# Patient Record
Sex: MALE | Race: WHITE | NOT HISPANIC OR LATINO | Employment: OTHER | URBAN - METROPOLITAN AREA
[De-identification: names, ages, dates, MRNs, and addresses within clinical notes are randomized per-mention and may not be internally consistent; named-entity substitution may affect disease eponyms.]

---

## 2020-06-01 ENCOUNTER — TRANSCRIBE ORDERS (OUTPATIENT)
Dept: CARDIOLOGY | Facility: CLINIC | Age: 50
End: 2020-06-01

## 2020-06-01 DIAGNOSIS — R94.39 ABNORMAL STRESS TEST: Primary | ICD-10-CM

## 2020-06-07 ENCOUNTER — APPOINTMENT (OUTPATIENT)
Dept: PREADMISSION TESTING | Facility: HOSPITAL | Age: 50
End: 2020-06-07

## 2020-06-07 PROCEDURE — C9803 HOPD COVID-19 SPEC COLLECT: HCPCS

## 2020-06-07 PROCEDURE — U0004 COV-19 TEST NON-CDC HGH THRU: HCPCS

## 2020-06-07 PROCEDURE — U0002 COVID-19 LAB TEST NON-CDC: HCPCS

## 2020-06-08 LAB
REF LAB TEST METHOD: NORMAL
SARS-COV-2 RNA RESP QL NAA+PROBE: NOT DETECTED

## 2020-06-09 ENCOUNTER — HOSPITAL ENCOUNTER (OUTPATIENT)
Facility: HOSPITAL | Age: 50
Discharge: HOME | End: 2020-06-09
Attending: INTERNAL MEDICINE | Admitting: INTERNAL MEDICINE

## 2020-06-09 VITALS
SYSTOLIC BLOOD PRESSURE: 134 MMHG | RESPIRATION RATE: 18 BRPM | BODY MASS INDEX: 30.65 KG/M2 | TEMPERATURE: 98.8 F | HEART RATE: 71 BPM | WEIGHT: 195.3 LBS | OXYGEN SATURATION: 95 % | DIASTOLIC BLOOD PRESSURE: 86 MMHG | HEIGHT: 67 IN

## 2020-06-09 DIAGNOSIS — R94.39 ABNORMAL STRESS TEST: ICD-10-CM

## 2020-06-09 LAB
ACT BLD: 367 SECONDS (ref 82–152)
ALBUMIN SERPL-MCNC: 4.3 G/DL (ref 3.5–5.2)
ALBUMIN/GLOB SERPL: 1.7 G/DL
ALP SERPL-CCNC: 86 U/L (ref 39–117)
ALT SERPL W P-5'-P-CCNC: 22 U/L (ref 1–41)
ANION GAP SERPL CALCULATED.3IONS-SCNC: 10 MMOL/L (ref 5–15)
AST SERPL-CCNC: 19 U/L (ref 1–40)
BILIRUB SERPL-MCNC: 0.4 MG/DL (ref 0.2–1.2)
BUN BLD-MCNC: 27 MG/DL (ref 6–20)
BUN/CREAT SERPL: 24.8 (ref 7–25)
CALCIUM SPEC-SCNC: 8.9 MG/DL (ref 8.6–10.5)
CHLORIDE SERPL-SCNC: 105 MMOL/L (ref 98–107)
CHOLEST SERPL-MCNC: 128 MG/DL (ref 0–200)
CO2 SERPL-SCNC: 24 MMOL/L (ref 22–29)
CREAT BLD-MCNC: 1.09 MG/DL (ref 0.76–1.27)
CREAT BLDA-MCNC: 0.9 MG/DL (ref 0.6–1.3)
DEPRECATED RDW RBC AUTO: 38.2 FL (ref 37–54)
ERYTHROCYTE [DISTWIDTH] IN BLOOD BY AUTOMATED COUNT: 12.1 % (ref 12.3–15.4)
GFR SERPL CREATININE-BSD FRML MDRD: 72 ML/MIN/1.73
GLOBULIN UR ELPH-MCNC: 2.6 GM/DL
GLUCOSE BLD-MCNC: 208 MG/DL (ref 65–99)
GLUCOSE BLDC GLUCOMTR-MCNC: 203 MG/DL (ref 70–130)
HBA1C MFR BLD: 8.2 % (ref 4.8–5.6)
HCT VFR BLD AUTO: 40.2 % (ref 37.5–51)
HDLC SERPL-MCNC: 29 MG/DL (ref 40–60)
HGB BLD-MCNC: 14 G/DL (ref 13–17.7)
LDLC SERPL CALC-MCNC: 70 MG/DL (ref 0–100)
LDLC/HDLC SERPL: 2.43 {RATIO}
MCH RBC QN AUTO: 30.3 PG (ref 26.6–33)
MCHC RBC AUTO-ENTMCNC: 34.8 G/DL (ref 31.5–35.7)
MCV RBC AUTO: 87 FL (ref 79–97)
PLATELET # BLD AUTO: 191 10*3/MM3 (ref 140–450)
PMV BLD AUTO: 10.2 FL (ref 6–12)
POTASSIUM BLD-SCNC: 4.6 MMOL/L (ref 3.5–5.2)
PROT SERPL-MCNC: 6.9 G/DL (ref 6–8.5)
RBC # BLD AUTO: 4.62 10*6/MM3 (ref 4.14–5.8)
SODIUM BLD-SCNC: 139 MMOL/L (ref 136–145)
TRIGL SERPL-MCNC: 143 MG/DL (ref 0–150)
VLDLC SERPL-MCNC: 28.6 MG/DL
WBC NRBC COR # BLD: 5.08 10*3/MM3 (ref 3.4–10.8)

## 2020-06-09 PROCEDURE — 80053 COMPREHEN METABOLIC PANEL: CPT | Performed by: NURSE PRACTITIONER

## 2020-06-09 PROCEDURE — C1769 GUIDE WIRE: HCPCS | Performed by: INTERNAL MEDICINE

## 2020-06-09 PROCEDURE — 93458 L HRT ARTERY/VENTRICLE ANGIO: CPT | Performed by: INTERNAL MEDICINE

## 2020-06-09 PROCEDURE — 85027 COMPLETE CBC AUTOMATED: CPT | Performed by: NURSE PRACTITIONER

## 2020-06-09 PROCEDURE — 93571 IV DOP VEL&/PRESS C FLO 1ST: CPT | Performed by: INTERNAL MEDICINE

## 2020-06-09 PROCEDURE — C9600 PERC DRUG-EL COR STENT SING: HCPCS | Performed by: INTERNAL MEDICINE

## 2020-06-09 PROCEDURE — 80061 LIPID PANEL: CPT | Performed by: INTERNAL MEDICINE

## 2020-06-09 PROCEDURE — 25010000002 HEPARIN (PORCINE) PER 1000 UNITS: Performed by: INTERNAL MEDICINE

## 2020-06-09 PROCEDURE — C1874 STENT, COATED/COV W/DEL SYS: HCPCS | Performed by: INTERNAL MEDICINE

## 2020-06-09 PROCEDURE — 25010000002 FENTANYL CITRATE (PF) 100 MCG/2ML SOLUTION: Performed by: INTERNAL MEDICINE

## 2020-06-09 PROCEDURE — 92928 PRQ TCAT PLMT NTRAC ST 1 LES: CPT | Performed by: INTERNAL MEDICINE

## 2020-06-09 PROCEDURE — C1725 CATH, TRANSLUMIN NON-LASER: HCPCS | Performed by: INTERNAL MEDICINE

## 2020-06-09 PROCEDURE — 36415 COLL VENOUS BLD VENIPUNCTURE: CPT

## 2020-06-09 PROCEDURE — 85347 COAGULATION TIME ACTIVATED: CPT

## 2020-06-09 PROCEDURE — C1887 CATHETER, GUIDING: HCPCS | Performed by: INTERNAL MEDICINE

## 2020-06-09 PROCEDURE — 0 IOPAMIDOL PER 1 ML: Performed by: INTERNAL MEDICINE

## 2020-06-09 PROCEDURE — 82962 GLUCOSE BLOOD TEST: CPT

## 2020-06-09 PROCEDURE — 25010000002 MIDAZOLAM PER 1 MG: Performed by: INTERNAL MEDICINE

## 2020-06-09 PROCEDURE — C1894 INTRO/SHEATH, NON-LASER: HCPCS | Performed by: INTERNAL MEDICINE

## 2020-06-09 PROCEDURE — 82565 ASSAY OF CREATININE: CPT

## 2020-06-09 PROCEDURE — S0260 H&P FOR SURGERY: HCPCS | Performed by: INTERNAL MEDICINE

## 2020-06-09 PROCEDURE — 83036 HEMOGLOBIN GLYCOSYLATED A1C: CPT | Performed by: INTERNAL MEDICINE

## 2020-06-09 DEVICE — XIENCE SIERRA™ EVEROLIMUS ELUTING CORONARY STENT SYSTEM 2.25 MM X 12 MM / RAPID-EXCHANGE
Type: IMPLANTABLE DEVICE | Status: FUNCTIONAL
Brand: XIENCE SIERRA™

## 2020-06-09 RX ORDER — ASPIRIN 325 MG
325 TABLET ORAL ONCE
Status: COMPLETED | OUTPATIENT
Start: 2020-06-09 | End: 2020-06-09

## 2020-06-09 RX ORDER — CARVEDILOL 25 MG/1
25 TABLET ORAL 2 TIMES DAILY WITH MEALS
COMMUNITY

## 2020-06-09 RX ORDER — ATORVASTATIN CALCIUM 10 MG/1
10 TABLET, FILM COATED ORAL DAILY
Status: ON HOLD | COMMUNITY
End: 2020-06-09 | Stop reason: SDUPTHER

## 2020-06-09 RX ORDER — NITROGLYCERIN 0.4 MG/1
0.4 TABLET SUBLINGUAL
COMMUNITY

## 2020-06-09 RX ORDER — ONDANSETRON 2 MG/ML
4 INJECTION INTRAMUSCULAR; INTRAVENOUS EVERY 6 HOURS PRN
Status: DISCONTINUED | OUTPATIENT
Start: 2020-06-09 | End: 2020-06-09 | Stop reason: HOSPADM

## 2020-06-09 RX ORDER — FENTANYL CITRATE 50 UG/ML
INJECTION, SOLUTION INTRAMUSCULAR; INTRAVENOUS AS NEEDED
Status: DISCONTINUED | OUTPATIENT
Start: 2020-06-09 | End: 2020-06-09 | Stop reason: HOSPADM

## 2020-06-09 RX ORDER — LISINOPRIL 10 MG/1
10 TABLET ORAL DAILY
COMMUNITY
End: 2022-08-25

## 2020-06-09 RX ORDER — HEPARIN SODIUM 1000 [USP'U]/ML
INJECTION, SOLUTION INTRAVENOUS; SUBCUTANEOUS AS NEEDED
Status: DISCONTINUED | OUTPATIENT
Start: 2020-06-09 | End: 2020-06-09 | Stop reason: HOSPADM

## 2020-06-09 RX ORDER — MIDAZOLAM HYDROCHLORIDE 1 MG/ML
INJECTION INTRAMUSCULAR; INTRAVENOUS AS NEEDED
Status: DISCONTINUED | OUTPATIENT
Start: 2020-06-09 | End: 2020-06-09 | Stop reason: HOSPADM

## 2020-06-09 RX ORDER — SODIUM CHLORIDE 0.9 % (FLUSH) 0.9 %
3 SYRINGE (ML) INJECTION EVERY 12 HOURS SCHEDULED
Status: DISCONTINUED | OUTPATIENT
Start: 2020-06-09 | End: 2020-06-09 | Stop reason: HOSPADM

## 2020-06-09 RX ORDER — ASPIRIN 81 MG/1
81 TABLET ORAL DAILY
COMMUNITY

## 2020-06-09 RX ORDER — NITROGLYCERIN 0.4 MG/1
0.4 TABLET SUBLINGUAL
Status: DISCONTINUED | OUTPATIENT
Start: 2020-06-09 | End: 2020-06-09 | Stop reason: HOSPADM

## 2020-06-09 RX ORDER — OMEPRAZOLE 20 MG/1
20 CAPSULE, DELAYED RELEASE ORAL DAILY
COMMUNITY
End: 2022-09-12 | Stop reason: SDUPTHER

## 2020-06-09 RX ORDER — CLOPIDOGREL BISULFATE 75 MG/1
TABLET ORAL AS NEEDED
Status: DISCONTINUED | OUTPATIENT
Start: 2020-06-09 | End: 2020-06-09 | Stop reason: HOSPADM

## 2020-06-09 RX ORDER — INSULIN GLARGINE 100 [IU]/ML
35 INJECTION, SOLUTION SUBCUTANEOUS NIGHTLY
COMMUNITY
End: 2022-08-25

## 2020-06-09 RX ORDER — CLOPIDOGREL BISULFATE 75 MG/1
75 TABLET ORAL DAILY
COMMUNITY

## 2020-06-09 RX ORDER — ATORVASTATIN CALCIUM 40 MG/1
40 TABLET, FILM COATED ORAL DAILY
Qty: 30 TABLET | Refills: 6 | Status: SHIPPED | OUTPATIENT
Start: 2020-06-09 | End: 2022-08-25

## 2020-06-09 RX ORDER — ASPIRIN 325 MG
325 TABLET, DELAYED RELEASE (ENTERIC COATED) ORAL DAILY
Status: DISCONTINUED | OUTPATIENT
Start: 2020-06-10 | End: 2020-06-09 | Stop reason: HOSPADM

## 2020-06-09 RX ORDER — LEVOTHYROXINE SODIUM 0.05 MG/1
50 TABLET ORAL DAILY
COMMUNITY
End: 2022-09-12 | Stop reason: SDUPTHER

## 2020-06-09 RX ORDER — ACETAMINOPHEN 325 MG/1
650 TABLET ORAL EVERY 4 HOURS PRN
Status: DISCONTINUED | OUTPATIENT
Start: 2020-06-09 | End: 2020-06-09 | Stop reason: HOSPADM

## 2020-06-09 RX ORDER — SODIUM CHLORIDE 0.9 % (FLUSH) 0.9 %
10 SYRINGE (ML) INJECTION AS NEEDED
Status: DISCONTINUED | OUTPATIENT
Start: 2020-06-09 | End: 2020-06-09 | Stop reason: HOSPADM

## 2020-06-09 RX ORDER — LIDOCAINE HYDROCHLORIDE 10 MG/ML
INJECTION, SOLUTION EPIDURAL; INFILTRATION; INTRACAUDAL; PERINEURAL AS NEEDED
Status: DISCONTINUED | OUTPATIENT
Start: 2020-06-09 | End: 2020-06-09 | Stop reason: HOSPADM

## 2020-06-09 RX ADMIN — ASPIRIN 325 MG ORAL TABLET 325 MG: 325 PILL ORAL at 10:09

## 2020-06-09 NOTE — H&P
[facility] at [facility]   History and physical    Referring Provider: Dr. Judi Dyer    Patient Care Team:  Nicho Dinh MD as PCP - General (Internal Medicine)     Problem list:  1. Coronary artery disease  1. History of MI and RCA stent ([facility]) [facility] 2016  2. 2/26/2020 myocardial perfusion study read by Dr. Mayfield with fixed inferior defect.  Anterolateral fully reversible defect.  Normal nuclear EF  2. Hypertension  3. Dyslipidemia  4. Diabetes  5. Hypothyroidism  6. GERD  7. Surgeries:  1. Appendectomy       Allergies   Allergen Reactions   • Penicillins Unknown - Low Severity           Current Facility-Administered Medications:   •  [COMPLETED] aspirin tablet 325 mg, 325 mg, Oral, Once, 325 mg at 06/09/20 1009 **AND** [START ON 6/10/2020] aspirin EC tablet 325 mg, 325 mg, Oral, Daily, Clevinger, Ruma, APRN  •  nitroglycerin (NITROSTAT) SL tablet 0.4 mg, 0.4 mg, Sublingual, Q5 Min PRN, Johnieer, Ruma, APRN  •  ondansetron (ZOFRAN) injection 4 mg, 4 mg, Intravenous, Q6H PRN, Clevinger, Ruma, APRN  •  sodium chloride 0.9 % flush 10 mL, 10 mL, Intravenous, PRN, Clevinger, Ruma, APRN  •  sodium chloride 0.9 % flush 3 mL, 3 mL, Intravenous, Q12H, Clevinger, Ruma, APRN         Medications Prior to Admission   Medication Sig Dispense Refill Last Dose   • aspirin 81 MG EC tablet Take 81 mg by mouth Daily.   6/9/2020 at 0600   • atorvastatin (LIPITOR) 10 MG tablet Take 10 mg by mouth Daily.   6/9/2020 at 0600   • carvedilol (COREG) 25 MG tablet Take 25 mg by mouth 2 (Two) Times a Day With Meals.   6/9/2020 at 0600   • clopidogrel (PLAVIX) 75 MG tablet Take 75 mg by mouth Daily.   6/9/2020 at 0600   • levothyroxine (SYNTHROID, LEVOTHROID) 50 MCG tablet Take 50 mcg by mouth Daily.   6/9/2020 at 0600   • lisinopril (PRINIVIL,ZESTRIL) 10 MG tablet Take 10 mg by mouth Daily.   6/9/2020 at 0600   • omeprazole (priLOSEC) 20 MG capsule Take 20 mg by mouth Daily.   6/9/2020 at 0600   • nitroglycerin  (NITROSTAT) 0.4 MG SL tablet Place 0.4 mg under the tongue Every 5 (Five) Minutes As Needed for Chest Pain. Take no more than 3 doses in 15 minutes.   Unknown at Unknown time         Subjective .   History of present illness:    Patient is a 50-year-old  male who we are seeing today for cardiac catheterization secondary to abnormal myocardial perfusion study.  He has previous history of coronary artery disease with previous myocardial infarction and RCA stenting.  Patient had a prolonged episode of chest discomfort back in February.  At that time he underwent further evaluation with myocardial perfusion study with abnormal results as noted above.  He did not have any further chest pain and did not pursue further invasive evaluation.  Most recently he followed up with his primary cardiologist and given his intermediate risk study it was felt prudent to proceed with invasive evaluation which he agreed to.  He still continues to not have any significant chest pain.  No syncope, near syncope.      Social History     Socioeconomic History   • Marital status:      Spouse name: Not on file   • Number of children: Not on file   • Years of education: Not on file   • Highest education level: Not on file   Tobacco Use   • Smoking status: Former Smoker     Last attempt to quit: 2015     Years since quittin.4   • Smokeless tobacco: Never Used   Substance and Sexual Activity   • Alcohol use: Never     Frequency: Never   • Drug use: Never     Family History   Problem Relation Age of Onset   • Heart disease Father        Review of Systems:  Review of Systems   Constitution: Negative for fever and malaise/fatigue.   HENT: Negative for nosebleeds.    Eyes: Negative for redness and visual disturbance.   Cardiovascular: Negative for orthopnea, palpitations and paroxysmal nocturnal dyspnea.   Respiratory: Positive for snoring. Negative for cough, sputum production and wheezing.    Hematologic/Lymphatic: Negative for  "bleeding problem.   Skin: Negative for flushing, itching and rash.   Musculoskeletal: Negative for falls, joint pain and muscle cramps.   Gastrointestinal: Negative for abdominal pain, diarrhea, heartburn, nausea and vomiting.   Genitourinary: Negative for hematuria.   Neurological: Negative for excessive daytime sleepiness, dizziness, headaches, tremors and weakness.   Psychiatric/Behavioral: Negative for substance abuse. The patient is not nervous/anxious.        Objective   Vitals:  /94 (BP Location: Right leg, Patient Position: Lying) Comment: 148/100 left  Pulse 71   Temp 98.8 °F (37.1 °C) (Temporal)   Resp 16   Ht 170.2 cm (67\")   Wt 88.6 kg (195 lb 4.8 oz)   SpO2 96%   BMI 30.59 kg/m²        Physical Exam   Constitutional: He is oriented to person, place, and time. He appears well-developed and well-nourished. No distress.   HENT:   Head: Normocephalic and atraumatic.   Eyes: Right eye exhibits no discharge. Left eye exhibits no discharge.   Neck: No JVD present. No tracheal deviation present.   Cardiovascular: Normal rate, regular rhythm, normal heart sounds and intact distal pulses. Exam reveals no friction rub.   No murmur heard.  Left Mark's test positive   Pulmonary/Chest: Effort normal and breath sounds normal. No respiratory distress.   Abdominal: Soft. Bowel sounds are normal. There is no tenderness.   Musculoskeletal: He exhibits no edema or deformity.   Neurological: He is alert and oriented to person, place, and time.   Skin: Skin is warm and dry.            Results Review:  I reviewed the patient's new clinical results.  Results from last 7 days   Lab Units 06/09/20  0955   WBC 10*3/mm3 5.08   HEMOGLOBIN g/dL 14.0   HEMATOCRIT % 40.2   PLATELETS 10*3/mm3 191           Invalid input(s): LABALBU, PROT          No results found for: TROPONINT                  Assessment/Plan     1. Abnormal myocardial perfusion study with anterior lateral defect.  New onset chest pain consistent with " unstable angina  2. Coronary artery disease with previous inferior MI and stenting.  3. Hypertension  4. Dyslipidemia  5. Diabetes      Plan:    1. We will proceed to cardiac catheterization plus minus catheter-based intervention today.  This was discussed with the patient.  He verbalizes understanding and wishes to proceed.  Further recommendations to follow cardiac catheterization.      LEEANN Saul     Dictated utilizing Dragon dictation

## 2020-06-10 ENCOUNTER — TELEPHONE (OUTPATIENT)
Dept: CARDIOLOGY | Facility: CLINIC | Age: 50
End: 2020-06-10

## 2020-06-10 ENCOUNTER — CALL CENTER PROGRAMS (OUTPATIENT)
Dept: CALL CENTER | Facility: HOSPITAL | Age: 50
End: 2020-06-10

## 2020-06-10 NOTE — OUTREACH NOTE
PCI Survey      Responses   Facility patient discharged from?  [facility]   Procedure date  06/09/20   PCI site:  Left, Arm   Performing MD Dr. Pietro Watters   Attempt successful?  Yes   Call start time  1026   Call end time  1044   Person spoke with today (if not patient) and relationship  Bethany, spouse   Has the patient had any of the following symptoms since discharge?  -- [Wife denies any symptoms. ]   Is the patient taking prescribed medications:  Plavix, ASA   Nursing intervention  Reminded to continue to take prescribed medications   Medication comments  Wife states that the changed dose for atorvastatin was not sent to [facility]. She reports that patients omeprazole dose was changed to 40mg instead. Wife to contact cardiology office this am to request med order for correct dose of atorvastatin.    Does the patient have any of the following symptoms related to the cath site?  -- [Dressing intact this am. No symptoms noted. ]   Nursing intervention  Patient education provided   Does the patient have an appointment scheduled with the cardiologist?  Yes   Appointment comments  Wife reports patient has follow up appt with Dr Dyer.    If the patient is a current smoker, are they able to teach back resources for cessation?  -- [Non smoker. ]   Did the patient feel prepared to go home on the same day as the procedure?  Yes   Is the patient satisfied with the same day discharge process?  Yes   PCI call completed  Yes          Jayde Thornton RN

## 2020-06-11 ENCOUNTER — DOCUMENTATION (OUTPATIENT)
Dept: CARDIAC REHAB | Facility: HOSPITAL | Age: 50
End: 2020-06-11

## 2020-07-07 ENCOUNTER — DOCUMENTATION (OUTPATIENT)
Dept: CARDIAC REHAB | Facility: HOSPITAL | Age: 50
End: 2020-07-07

## 2020-07-07 NOTE — PROGRESS NOTES
Cardiac Rehab staff mailed referral letter to patient regarding Phase II Cardiac Rehab program. Instruction for patient to contact [facility] for additional program information and to forward referral to closest facility.

## 2022-08-24 PROBLEM — N43.3 HYDROCELE, RIGHT: Status: ACTIVE | Noted: 2022-08-24

## 2022-08-24 PROBLEM — I65.23 OCCLUSION AND STENOSIS OF BILATERAL CAROTID ARTERIES: Status: ACTIVE | Noted: 2022-08-24

## 2022-08-24 PROBLEM — E11.9 INSULIN DEPENDENT TYPE 2 DIABETES MELLITUS: Status: ACTIVE | Noted: 2022-08-24

## 2022-08-24 PROBLEM — Z79.4 INSULIN DEPENDENT TYPE 2 DIABETES MELLITUS: Status: ACTIVE | Noted: 2022-08-24

## 2022-08-24 PROBLEM — N18.31 CHRONIC KIDNEY DISEASE, STAGE 3A: Status: ACTIVE | Noted: 2022-08-24

## 2022-08-24 PROBLEM — I21.9 MI (MYOCARDIAL INFARCTION): Status: ACTIVE | Noted: 2022-08-24

## 2022-08-24 PROBLEM — E66.3 OVERWEIGHT: Status: ACTIVE | Noted: 2022-08-24

## 2022-08-24 PROBLEM — I25.10 CORONARY ARTERY DISEASE: Status: ACTIVE | Noted: 2022-08-24

## 2022-08-24 PROBLEM — M54.42 LUMBAGO WITH SCIATICA, LEFT SIDE: Status: ACTIVE | Noted: 2022-08-24

## 2022-08-24 PROBLEM — I25.2 STATUS POST NON-ST ELEVATION MYOCARDIAL INFARCTION (NSTEMI): Status: ACTIVE | Noted: 2022-08-24

## 2022-08-24 PROBLEM — J45.909 ASTHMA: Status: ACTIVE | Noted: 2022-08-24

## 2022-08-24 PROBLEM — G57.11 MERALGIA PARESTHETICA, RIGHT LOWER LIMB: Status: ACTIVE | Noted: 2022-08-24

## 2022-08-24 PROBLEM — E03.9 HYPOTHYROIDISM, UNSPECIFIED: Status: ACTIVE | Noted: 2022-08-24

## 2022-08-24 PROBLEM — K21.00 GASTRO-ESOPHAGEAL REFLUX DISEASE WITH ESOPHAGITIS: Status: ACTIVE | Noted: 2022-08-24

## 2022-08-24 PROBLEM — E11.21 TYPE 2 DIABETES MELLITUS WITH DIABETIC NEPHROPATHY: Status: ACTIVE | Noted: 2022-08-24

## 2022-08-24 PROBLEM — I10 HYPERTENSION: Status: ACTIVE | Noted: 2022-08-24

## 2022-08-24 PROBLEM — E78.5 HYPERLIPIDEMIA: Status: ACTIVE | Noted: 2022-08-24

## 2022-08-25 ENCOUNTER — OFFICE VISIT (OUTPATIENT)
Dept: FAMILY MEDICINE CLINIC | Facility: CLINIC | Age: 52
End: 2022-08-25

## 2022-08-25 VITALS
RESPIRATION RATE: 12 BRPM | HEART RATE: 66 BPM | BODY MASS INDEX: 30.7 KG/M2 | SYSTOLIC BLOOD PRESSURE: 156 MMHG | HEIGHT: 67 IN | OXYGEN SATURATION: 98 % | WEIGHT: 195.6 LBS | DIASTOLIC BLOOD PRESSURE: 87 MMHG | TEMPERATURE: 97.6 F

## 2022-08-25 DIAGNOSIS — U07.1 CLINICAL DIAGNOSIS OF SEVERE ACUTE RESPIRATORY SYNDROME CORONAVIRUS 2 (SARS-COV-2) DISEASE: ICD-10-CM

## 2022-08-25 DIAGNOSIS — U07.1 CLINICAL DIAGNOSIS OF SEVERE ACUTE RESPIRATORY SYNDROME CORONAVIRUS 2 (SARS-COV-2) DISEASE: Primary | ICD-10-CM

## 2022-08-25 DIAGNOSIS — Z20.822 CLOSE EXPOSURE TO COVID-19 VIRUS: ICD-10-CM

## 2022-08-25 DIAGNOSIS — I25.10 CORONARY ARTERY DISEASE INVOLVING NATIVE CORONARY ARTERY OF NATIVE HEART WITHOUT ANGINA PECTORIS: ICD-10-CM

## 2022-08-25 DIAGNOSIS — J02.9 SORE THROAT: ICD-10-CM

## 2022-08-25 DIAGNOSIS — E11.9 INSULIN DEPENDENT TYPE 2 DIABETES MELLITUS: ICD-10-CM

## 2022-08-25 DIAGNOSIS — Z79.4 INSULIN DEPENDENT TYPE 2 DIABETES MELLITUS: ICD-10-CM

## 2022-08-25 LAB
EXPIRATION DATE: ABNORMAL
EXPIRATION DATE: NORMAL
FLUAV AG UPPER RESP QL IA.RAPID: NOT DETECTED
FLUBV AG UPPER RESP QL IA.RAPID: NOT DETECTED
INTERNAL CONTROL: ABNORMAL
INTERNAL CONTROL: NORMAL
Lab: ABNORMAL
Lab: NORMAL
S PYO AG THROAT QL: NEGATIVE
SARS-COV-2 RNA RESP QL NAA+PROBE: DETECTED

## 2022-08-25 PROCEDURE — 87428 SARSCOV & INF VIR A&B AG IA: CPT | Performed by: INTERNAL MEDICINE

## 2022-08-25 PROCEDURE — 87880 STREP A ASSAY W/OPTIC: CPT | Performed by: INTERNAL MEDICINE

## 2022-08-25 PROCEDURE — 99213 OFFICE O/P EST LOW 20 MIN: CPT | Performed by: INTERNAL MEDICINE

## 2022-08-25 RX ORDER — INSULIN LISPRO 100 [IU]/ML
INJECTION, SOLUTION INTRAVENOUS; SUBCUTANEOUS
COMMUNITY
Start: 2022-07-05 | End: 2022-09-12 | Stop reason: SDUPTHER

## 2022-08-25 RX ORDER — ATORVASTATIN CALCIUM 80 MG/1
80 TABLET, FILM COATED ORAL EVERY EVENING
COMMUNITY
Start: 2022-08-07

## 2022-08-25 RX ORDER — LISINOPRIL 40 MG/1
TABLET ORAL
COMMUNITY
Start: 2022-08-10

## 2022-08-25 RX ORDER — INSULIN GLARGINE 100 [IU]/ML
INJECTION, SOLUTION SUBCUTANEOUS
COMMUNITY
Start: 2022-07-25

## 2022-08-25 RX ORDER — EMPAGLIFLOZIN 10 MG/1
10 TABLET, FILM COATED ORAL DAILY
COMMUNITY
Start: 2022-05-30

## 2022-08-25 NOTE — TELEPHONE ENCOUNTER
Caller: [facility] [address] [place] [address] [address] - [phone]  - [phone]    Relationship: Pharmacy    Best call back number: [phone]    Requested Prescriptions:   Requested Prescriptions     Pending Prescriptions Disp Refills   • Nirmatrelvir&Ritonavir 300/100 (PAXLOVID) 20 x 150 MG & 10 x 100MG tablet therapy pack tablet 20 tablet 0     Sig: Take 2 tablets by mouth 2 (Two) Times a Day for 5 days.        Additional details provided by patient: PHARMACY STATES THAT THEY DO NOT HAVE THIS MEDICATION IN STOCK.    Does the patient have less than a 3 day supply:  [x] Yes  [] No    Sonja Gamble Rep   08/25/22 15:51 EDT

## 2022-08-25 NOTE — PROGRESS NOTES
Follow Up Office Visit      Date: 2022   Patient Name: Wisam Sarmiento  : 1970   MRN: [id number]     Chief Complaint:    Chief Complaint   Patient presents with   • Sore Throat   • Nasal Congestion       History of Present Illness: Wisam Sarmiento is a 52 y.o. male who is here today for onset 5 days ago of nasal congestion drainage with intermittent headache, and 4 days of a sore throat.  No fevers chills or myalgia no GI symptoms.  As he was coming over today he was notified by his wife and child that they had both just tested positive for COVID-19.  Patient has had COVID-19 vaccine x2 but none of the boosters.  He is a high risk individual with diabetes and heart disease.  Ex-smoker..    Subjective      Review of Systems:   Review of Systems    I have reviewed the patients family history, social history, past medical history, past surgical history and have updated it as appropriate.     Medications:     Current Outpatient Medications:   •  aspirin 81 MG EC tablet, Take 81 mg by mouth Daily., Disp: , Rfl:   •  carvedilol (COREG) 25 MG tablet, Take 25 mg by mouth 2 (Two) Times a Day With Meals., Disp: , Rfl:   •  clopidogrel (PLAVIX) 75 MG tablet, Take 75 mg by mouth Daily., Disp: , Rfl:   •  levothyroxine (SYNTHROID, LEVOTHROID) 50 MCG tablet, Take 50 mcg by mouth Daily., Disp: , Rfl:   •  nitroglycerin (NITROSTAT) 0.4 MG SL tablet, Place 0.4 mg under the tongue Every 5 (Five) Minutes As Needed for Chest Pain. Take no more than 3 doses in 15 minutes., Disp: , Rfl:   •  omeprazole (priLOSEC) 20 MG capsule, Take 20 mg by mouth Daily., Disp: , Rfl:   •  atorvastatin (LIPITOR) 80 MG tablet, Take 80 mg by mouth Every Evening., Disp: , Rfl:   •  HumaLOG KwikPen 100 UNIT/ML solution pen-injector, INJECT 5 UNITS SUBCUTANEOUSLY BEFORE MEAL(S), Disp: , Rfl:   •  Jardiance 10 MG tablet tablet, Take 10 mg by mouth Daily., Disp: , Rfl:   •  Lantus SoloStar 100 UNIT/ML injection pen, INJECT 50 TO 80 UNITS  "SUBCUTANEOUSLY AT BEDTIME AS DIRECTED. (STARTING AT 53 UNITS, THEN INCREASE BY 3 UNITS EVERY 3-4 DAYS UNTIL MORNING FASTING GLUCOSE ), Disp: , Rfl:   •  lisinopril (PRINIVIL,ZESTRIL) 40 MG tablet, , Disp: , Rfl:   •  Nirmatrelvir&Ritonavir 300/100 (PAXLOVID) 20 x 150 MG & 10 x 100MG tablet therapy pack tablet, Take 2 tablets by mouth 2 (Two) Times a Day for 5 days., Disp: 20 tablet, Rfl: 0    Allergies:   Allergies   Allergen Reactions   • Penicillins Unknown - Low Severity       Objective     Physical Exam: Please see above  Vital Signs:   Vitals:    08/25/22 1255   BP: 156/87   BP Location: Left arm   Patient Position: Sitting   Cuff Size: Adult   Pulse: 66   Resp: 12   Temp: 97.6 °F (36.4 °C)   TempSrc: Temporal   SpO2: 98%   Weight: 88.7 kg (195 lb 9.6 oz)   Height: 170.2 cm (67\")     Body mass index is 30.64 kg/m².       Physical Exam  General: Pleasant not acutely ill-appearing 52-year-old white male with a BMI of 30.6.  Patient evaluated wearing a facemask with me wearing full PPE.  Head and neck: TMs and canals bilaterally clear, nares mild congestion with minimal mucus, sinuses nontender, oropharynx with moderate posterior erythema but no exudate or petechiae, moist membranes, upper denture plate, neck supple with no masses or tenderness  Lungs clear with no wheeze tachypnea or cough  Cardiac regular rate rhythm with no murmurs gallops or rubs  Procedures    Results:   Labs:   Hemoglobin A1C   Date Value Ref Range Status   06/09/2020 8.20 (H) 4.80 - 5.60 % Final        POCT Results (if applicable):   Results for orders placed or performed in visit on 08/25/22   POCT rapid strep A    Specimen: Swab   Result Value Ref Range    Rapid Strep A Screen Negative Negative, VALID, INVALID, Not Performed    Internal Control Passed Passed    Lot Number [id number]     Expiration Date 12/15/2024    Covid-19 + Flu A&B AG, Veritor    Specimen: Swab   Result Value Ref Range    COVID19 Detected (A) Not Detected - Ref. " Range    Influenza A Antigen DELVIN Not Detected (A) Not Detected    Influenza B Antigen DELVIN Not Detected Not Detected    Internal Control Passed Passed    Lot Number [id number]     Expiration Date 02/07/2023        Imaging:   No valid procedures specified.       Assessment / Plan      Assessment/Plan:   Diagnoses and all orders for this visit:    1. Clinical diagnosis of severe acute respiratory syndrome coronavirus 2 (SARS-CoV-2) disease (Primary)  -     Nirmatrelvir&Ritonavir 300/100 (PAXLOVID) 20 x 150 MG & 10 x 100MG tablet therapy pack tablet; Take 2 tablets by mouth 2 (Two) Times a Day for 5 days.  Dispense: 20 tablet; Refill: 0  Positive COVID-19 rapid antigen test today.  Given high risk status, noting he has chronic kidney disease stage III, will treat with Paxil Dm at the lower dose regimen.  Patient is to hold his Plavix and Lipitor while on this therapy.  2. Insulin dependent type 2 diabetes mellitus (HCC)  Historically clinically stable.  We will update further at his upcoming complete physical.  3. Coronary artery disease involving native coronary artery of native heart without angina pectoris  Reassess at upcoming complete physical.  4. Sore throat  -     POCT rapid strep A  -     Covid-19 + Flu A&B AG, Veritor  Negative rapid strep screen and negative rapid flu for type a and type B.  5. Close exposure to COVID-19 virus  -     Covid-19 + Flu A&B AG, Veritor  Positive COVID-19 rapid antigen test today.      Follow Up:   Return for Annual physical.      At [facility], we believe that sharing information builds trust and better relationships. You are receiving this note because you recently visited [facility]. It is possible you will see health information before a provider has talked with you about it. This kind of information can be easy to misunderstand. To help you fully understand what it means for your health, we urge you to discuss this note with your provider.    Nicho Dinh MD  [facility]  ANITA Bajwa

## 2022-09-12 ENCOUNTER — OFFICE VISIT (OUTPATIENT)
Dept: FAMILY MEDICINE CLINIC | Facility: CLINIC | Age: 52
End: 2022-09-12

## 2022-09-12 VITALS
RESPIRATION RATE: 12 BRPM | BODY MASS INDEX: 31.42 KG/M2 | HEART RATE: 80 BPM | TEMPERATURE: 98.3 F | DIASTOLIC BLOOD PRESSURE: 96 MMHG | HEIGHT: 67 IN | OXYGEN SATURATION: 98 % | WEIGHT: 200.2 LBS | SYSTOLIC BLOOD PRESSURE: 140 MMHG

## 2022-09-12 DIAGNOSIS — N52.9 VASCULOGENIC ERECTILE DYSFUNCTION, UNSPECIFIED VASCULOGENIC ERECTILE DYSFUNCTION TYPE: ICD-10-CM

## 2022-09-12 DIAGNOSIS — Z12.11 SCREEN FOR COLON CANCER: ICD-10-CM

## 2022-09-12 DIAGNOSIS — N18.2 CHRONIC KIDNEY DISEASE, STAGE II (MILD): ICD-10-CM

## 2022-09-12 DIAGNOSIS — K21.00 GASTROESOPHAGEAL REFLUX DISEASE WITH ESOPHAGITIS WITHOUT HEMORRHAGE: ICD-10-CM

## 2022-09-12 DIAGNOSIS — Z11.4 SCREENING FOR HIV (HUMAN IMMUNODEFICIENCY VIRUS): ICD-10-CM

## 2022-09-12 DIAGNOSIS — F17.211 CIGARETTE NICOTINE DEPENDENCE IN REMISSION: ICD-10-CM

## 2022-09-12 DIAGNOSIS — N43.3 HYDROCELE, RIGHT: ICD-10-CM

## 2022-09-12 DIAGNOSIS — Z11.59 NEED FOR HEPATITIS C SCREENING TEST: ICD-10-CM

## 2022-09-12 DIAGNOSIS — E78.2 MIXED HYPERLIPIDEMIA: ICD-10-CM

## 2022-09-12 DIAGNOSIS — I65.23 OCCLUSION AND STENOSIS OF BILATERAL CAROTID ARTERIES: ICD-10-CM

## 2022-09-12 DIAGNOSIS — Z79.4 INSULIN DEPENDENT TYPE 2 DIABETES MELLITUS: ICD-10-CM

## 2022-09-12 DIAGNOSIS — E11.9 INSULIN DEPENDENT TYPE 2 DIABETES MELLITUS: ICD-10-CM

## 2022-09-12 DIAGNOSIS — I10 PRIMARY HYPERTENSION: ICD-10-CM

## 2022-09-12 DIAGNOSIS — E03.9 ACQUIRED HYPOTHYROIDISM: ICD-10-CM

## 2022-09-12 DIAGNOSIS — F17.220 CHEWING TOBACCO NICOTINE DEPENDENCE, UNCOMPLICATED: ICD-10-CM

## 2022-09-12 DIAGNOSIS — Z00.00 ROUTINE GENERAL MEDICAL EXAMINATION AT A HEALTH CARE FACILITY: Primary | ICD-10-CM

## 2022-09-12 DIAGNOSIS — E66.9 MILD OBESITY: ICD-10-CM

## 2022-09-12 DIAGNOSIS — Z23 NEED FOR IMMUNIZATION AGAINST INFLUENZA: ICD-10-CM

## 2022-09-12 DIAGNOSIS — I25.10 CORONARY ARTERY DISEASE INVOLVING NATIVE CORONARY ARTERY OF NATIVE HEART WITHOUT ANGINA PECTORIS: ICD-10-CM

## 2022-09-12 DIAGNOSIS — Z12.5 SCREENING FOR PROSTATE CANCER: ICD-10-CM

## 2022-09-12 PROCEDURE — 90471 IMMUNIZATION ADMIN: CPT | Performed by: INTERNAL MEDICINE

## 2022-09-12 PROCEDURE — 93000 ELECTROCARDIOGRAM COMPLETE: CPT | Performed by: INTERNAL MEDICINE

## 2022-09-12 PROCEDURE — 90688 IIV4 VACCINE SPLT 0.5 ML IM: CPT | Performed by: INTERNAL MEDICINE

## 2022-09-12 PROCEDURE — 36415 COLL VENOUS BLD VENIPUNCTURE: CPT | Performed by: INTERNAL MEDICINE

## 2022-09-12 PROCEDURE — 99396 PREV VISIT EST AGE 40-64: CPT | Performed by: INTERNAL MEDICINE

## 2022-09-12 RX ORDER — OMEPRAZOLE 20 MG/1
20 CAPSULE, DELAYED RELEASE ORAL DAILY
Qty: 90 CAPSULE | Refills: 3 | Status: SHIPPED | OUTPATIENT
Start: 2022-09-12

## 2022-09-12 RX ORDER — INSULIN LISPRO 100 [IU]/ML
5-7 INJECTION, SOLUTION INTRAVENOUS; SUBCUTANEOUS
Qty: 10 ML | Refills: 5 | Status: SHIPPED | OUTPATIENT
Start: 2022-09-12

## 2022-09-12 RX ORDER — LEVOTHYROXINE SODIUM 0.05 MG/1
50 TABLET ORAL DAILY
Qty: 90 TABLET | Refills: 0 | Status: SHIPPED | OUTPATIENT
Start: 2022-09-12 | End: 2023-01-03

## 2022-09-12 NOTE — PROGRESS NOTES
Male Physical Note      Date: 2022   Patient Name: Wisam Sarmiento  : 1970   MRN: [id number]     Chief Complaint:    Chief Complaint   Patient presents with   • Annual Exam       History of Present Illness: Wisam Sarmiento is a 52 y.o. male who is here today for their annual health maintenance and physical.  No specific concerns.  Type II diabetic on insulin with blood sugars averaging 100-115 checked 3 times daily, taking Lantus 70 units nightly and Humalog 5 to 7 units before every meal.  Blood pressures averaging 130 135/80-85 with pulse rate 70-80 checked twice weekly.  History of premature coronary disease with stent placement with prior MI, subsequent note of having preserved EF, also noncritical carotid vascular disease.  Stop smoking  after his smoking 1 to 2 packs/day.  Does chew tobacco but has cut back from 3 cans down to 1 can daily trying to stop.  No chest pains palpitations dyspnea dizziness or edema.  No neuropathic symptoms in his feet.  Does have GERD which resolves with omeprazole, recurs after he stops omeprazole for 2 or 3 days.  No change in bowel habits.  Has not yet had colon cancer screening.  No family history of same.  Does have some erectile dysfunction but denies any targeted treatment.      Subjective      Review of Systems:   Review of Systems    Past Medical History, Social History, Family History and Care Team were all reviewed with patient and updated as appropriate.     Medications:     Current Outpatient Medications:   •  aspirin 81 MG EC tablet, Take 81 mg by mouth Daily., Disp: , Rfl:   •  atorvastatin (LIPITOR) 80 MG tablet, Take 80 mg by mouth Every Evening., Disp: , Rfl:   •  carvedilol (COREG) 25 MG tablet, Take 25 mg by mouth 2 (Two) Times a Day With Meals., Disp: , Rfl:   •  clopidogrel (PLAVIX) 75 MG tablet, Take 75 mg by mouth Daily., Disp: , Rfl:   •  HumaLOG KwikPen 100 UNIT/ML solution pen-injector, Inject 5-7 Units under the skin into the appropriate  area as directed 3 (Three) Times a Day Before Meals., Disp: 10 mL, Rfl: 5  •  Jardiance 10 MG tablet tablet, Take 10 mg by mouth Daily., Disp: , Rfl:   •  Lantus SoloStar 100 UNIT/ML injection pen, INJECT 50 TO 80 UNITS SUBCUTANEOUSLY AT BEDTIME AS DIRECTED. (STARTING AT 53 UNITS, THEN INCREASE BY 3 UNITS EVERY 3-4 DAYS UNTIL MORNING FASTING GLUCOSE ), Disp: , Rfl:   •  levothyroxine (SYNTHROID, LEVOTHROID) 50 MCG tablet, Take 1 tablet by mouth Daily., Disp: 90 tablet, Rfl: 0  •  lisinopril (PRINIVIL,ZESTRIL) 40 MG tablet, , Disp: , Rfl:   •  nitroglycerin (NITROSTAT) 0.4 MG SL tablet, Place 0.4 mg under the tongue Every 5 (Five) Minutes As Needed for Chest Pain. Take no more than 3 doses in 15 minutes., Disp: , Rfl:   •  omeprazole (priLOSEC) 20 MG capsule, Take 1 capsule by mouth Daily., Disp: 90 capsule, Rfl: 3    Allergies:   Allergies   Allergen Reactions   • Penicillins Unknown - Low Severity       Immunizations:  Health Maintenance Summary          Ordered - URINE MICROALBUMIN (Yearly) Ordered on 9/12/2022    No completion, postpone, or frequency change history exists for this topic.          Ordered - COLORECTAL CANCER SCREENING (COLONOSCOPY - Every 10 Years) Ordered on 9/12/2022    No completion, postpone, or frequency change history exists for this topic.          Overdue - Hepatitis B (1 of 3 - Risk 3-dose series) Overdue - never done    No completion, postpone, or frequency change history exists for this topic.          Overdue - Pneumococcal Vaccine 0-64 (2 - PCV) Overdue since 3/13/2019    03/13/2018  Imm Admin: Pneumococcal Polysaccharide (PPSV23)    03/13/2018  Imm Admin: Pneumococcal Polysaccharide (PPSV23)          Ordered - HEPATITIS C SCREENING (Once) Ordered on 9/12/2022    No completion, postpone, or frequency change history exists for this topic.          Overdue - COVID-19 Vaccine (3 - Booster for Pfizer series) Overdue since 9/20/2021 04/20/2021  Imm Admin: COVID-19 (PFIZER) PURPLE  CAP    03/24/2021  Imm Admin: COVID-19 (PFIZER) PURPLE CAP          Overdue - ZOSTER VACCINE (1 of 2) Overdue - never done    No completion, postpone, or frequency change history exists for this topic.          Ordered - LUNG CANCER SCREENING (Yearly) Ordered on 9/12/2022    No completion, postpone, or frequency change history exists for this topic.          Ordered - HEMOGLOBIN A1C (Every 6 Months) Ordered on 9/12/2022 12/14/2021  Done    06/09/2020  Hemoglobin A1C component of Hemoglobin A1c          Overdue - ANNUAL PHYSICAL (Yearly) Overdue since 6/15/2022    06/14/2021  Done          Ordered - LIPID PANEL (Yearly) Ordered on 9/12/2022 06/11/2021  Done    06/09/2020  Lipid Panel          INFLUENZA VACCINE (Yearly - October to March) Next due on 10/1/2022    09/12/2022  Imm Admin: Fluzone Quad >6mos (Multi-dose)    09/14/2021  Imm Admin: Influenza, Unspecified    09/14/2020  Imm Admin: Influenza TIV (IM)    09/12/2019  Imm Admin: Influenza TIV (IM)    09/13/2018  Imm Admin: Influenza TIV (IM)          DIABETIC EYE EXAM (Yearly) Next due on 2/1/2023 02/01/2022  Patient-Reported (Performed Externally) - early diabetic changes          DIABETIC FOOT EXAM (Yearly) Next due on 9/12/2023 09/12/2022  Done          TDAP/TD VACCINES (2 - Td or Tdap) Next due on 12/16/2026 12/16/2016  Imm Admin: Tdap                Orders Placed This Encounter   Procedures   • Fluzone Quad >6 mos (Multi-dose) (3764-2232)       Colorectal Screening:   Referral for Cologuard made today  Last Completed Colonoscopy     This patient has no relevant Health Maintenance data.        CT for Smoker (Age 50-80, 20pk yr within last 15 years): Referral pending  Bone Density/DEXA (high risk): N/A  Hep C (Age 18-79 once): Pending  HIV (Age 15-65 once) : Pending  PSA (Over age 50, C Level Recommendation): Pending  US Aorta (For male smokers, age 65): N/A  A1c:   Hemoglobin A1C   Date Value Ref Range Status   06/09/2020 8.20 (H) 4.80 - 5.60  "% Final     Lipid panel: No results found for: LABLIPI    The ASCVD Risk score (Fabianaadrien SMITH Jr., et al., 2013) failed to calculate for the following reasons:    The patient has a prior MI or stroke diagnosis      Tobacco Use: High Risk   • Smoking Tobacco Use: Former Smoker   • Smokeless Tobacco Use: Current User       Social History     Substance and Sexual Activity   Alcohol Use Never    Comment: no alcohol x4+ years, previous light social drinker        Social History     Substance and Sexual Activity   Drug Use Never        Diet/Physical activity: Active daily, generally compliant with diabetic diet    Sexual health: Does have some erectile dysfunction, no contraception used    PHQ-2 Depression Screening  PHQ-9 Total Score:             Objective     Physical Exam:  Vital Signs:   Vitals:    09/12/22 1301   BP: 140/96   BP Location: Left arm   Patient Position: Sitting   Cuff Size: Adult   Pulse: 80   Resp: 12   Temp: 98.3 °F (36.8 °C)   TempSrc: Temporal   SpO2: 98%   Weight: 90.8 kg (200 lb 3.2 oz)   Height: 170.2 cm (67\")     Body mass index is 31.36 kg/m².     Physical Exam  Vitals and nursing note reviewed.   Constitutional:       Appearance: Normal appearance. He is obese.      Comments: No acute distress, alert and oriented, fluent speech, mildly obese   HENT:      Head: Normocephalic and atraumatic.      Right Ear: Tympanic membrane, ear canal and external ear normal.      Left Ear: Tympanic membrane, ear canal and external ear normal.      Nose: Nose normal.      Mouth/Throat:      Mouth: Mucous membranes are moist.      Pharynx: Oropharynx is clear.      Comments: Upper denture plate with patient declines to remove given glue, residual anterior mandibular dentition good condition, no mucosal abnormality  Eyes:      Extraocular Movements: Extraocular movements intact.      Conjunctiva/sclera: Conjunctivae normal.      Pupils: Pupils are equal, round, and reactive to light.   Neck:      Vascular: No carotid " bruit.   Cardiovascular:      Rate and Rhythm: Normal rate and regular rhythm.      Pulses: Normal pulses.      Heart sounds: Normal heart sounds. No murmur heard.    No friction rub. No gallop.   Pulmonary:      Effort: Pulmonary effort is normal.      Breath sounds: Normal breath sounds.      Comments: Good airflow, no cough wheeze or dyspnea  Abdominal:      General: Abdomen is flat. Bowel sounds are normal.      Palpations: Abdomen is soft. There is no mass.      Tenderness: There is no abdominal tenderness. There is no guarding or rebound.   Genitourinary:     Penis: Normal.       Prostate: Normal.      Rectum: Normal.      Comments: Large several centimeter stable hydrocele right scrotum, nontender, left testicle unremarkable, no inguinal herniation or adenopathy  Musculoskeletal:         General: No swelling, tenderness or deformity. Normal range of motion.      Cervical back: Normal range of motion and neck supple. No rigidity or tenderness.      Right lower leg: No edema.      Left lower leg: No edema.   Lymphadenopathy:      Cervical: No cervical adenopathy.   Skin:     General: Skin is warm and dry.      Capillary Refill: Capillary refill takes less than 2 seconds.      Findings: No lesion or rash.   Neurological:      General: No focal deficit present.      Mental Status: He is alert and oriented to person, place, and time. Mental status is at baseline.      Comments: Bipedal exam with 2+ pulses, normal sensation in both feet to fine touch vibration and pinprick   Psychiatric:         Mood and Affect: Mood normal.         Behavior: Behavior normal.         Thought Content: Thought content normal.         Judgment: Judgment normal.          POCT Results (if applicable):       ECG 12 Lead    Date/Time: 9/12/2022 1:24 PM  Performed by: Nicho Dinh MD  Authorized by: Nicho Dinh MD   Comparison: compared with previous ECG from 9/14/2021  Comparison to previous ECG: Normal sinus rhythm rate 78 with  nonspecific T wave inversion in aVL, unchanged versus prior tracing              Assessment / Plan      Assessment/Plan:   Diagnoses and all orders for this visit:    1. Routine general medical examination at a health care facility (Primary)    2. Coronary artery disease involving native coronary artery of native heart without angina pectoris  Asymptomatic.  Continue risk factor modification.  Patient advised to contact Dr. Dyer of cardiology for routine follow-up.  EKG today unremarkable.  3. Occlusion and stenosis of bilateral carotid arteries  Asymptomatic.  Continuing risk factor modification.  Again patient advised to contact Dr. Dyer of cardiology for routine follow-up.  4. Mixed hyperlipidemia  -     Lipid Panel; Future  -     Lipid Panel  On statin.  Update lipid profile.  5. Primary hypertension  -     ECG 12 Lead  -     Comprehensive Metabolic Panel; Future  -     Urinalysis With Culture If Indicated -; Future  -     Urinalysis With Culture If Indicated - Urine, Clean Catch  -     Comprehensive Metabolic Panel  EKG today unremarkable.  Blood pressures historically well controlled.  Continue current regimen.  6. Acquired hypothyroidism  -     TSH; Future  -     T4, Free; Future  -     levothyroxine (SYNTHROID, LEVOTHROID) 50 MCG tablet; Take 1 tablet by mouth Daily.  Dispense: 90 tablet; Refill: 0  -     T4, Free  -     TSH  On thyroid replacement.  Update thyroid function testing.  7. Insulin dependent type 2 diabetes mellitus (HCC)  -     MicroAlbumin, Urine, Random - Urine, Clean Catch; Future  -     Comprehensive Metabolic Panel; Future  -     Hemoglobin A1c; Future  -     HumaLOG KwikPen 100 UNIT/ML solution pen-injector; Inject 5-7 Units under the skin into the appropriate area as directed 3 (Three) Times a Day Before Meals.  Dispense: 10 mL; Refill: 5  -     Hemoglobin A1c  -     Comprehensive Metabolic Panel  -     MicroAlbumin, Urine, Random - Urine, Clean Catch  Historically has had very good  control, with no secondary sequelae other than stage II chronic kidney disease.  On ARB and Jardiance.  Update renal function.  8. Gastroesophageal reflux disease with esophagitis without hemorrhage  -     omeprazole (priLOSEC) 20 MG capsule; Take 1 capsule by mouth Daily.  Dispense: 90 capsule; Refill: 3  Controlled on medication.  Try to wean down dosing as tolerated.  Avoid GI irritants.  9. Mild obesity  Discussed healthy lifestyle with diet exercise and need for further weight loss.  10. Chronic kidney disease, stage II (mild)  -     Urinalysis With Culture If Indicated -; Future  -     Urinalysis With Culture If Indicated - Urine, Clean Catch  On ARB and Jardiance.  Updating renal function.  11. Hydrocele, right  Clinically stable for years.  Observation recommended.  12. Screening for prostate cancer  -     PSA Screen; Future  -     PSA Screen  Normal exam.  Check PSA.  13. Screen for colon cancer  -     Cologuard - Stool, Per Rectum; Future  Discussed colon cancer options, specifically colonoscopy versus Cologuard.  Patient is low risk with no family history.  Patient opts for Cologuard, and will call for results.  14. Need for hepatitis C screening test  -     Hepatitis C Antibody; Future  -     Hepatitis C Antibody  Check routine recommended screen.  15. Screening for HIV (human immunodeficiency virus)  -     Human Immunodeficiency Virus 1 & 2 (HIV-1/HIV-2), Qualitative, RNA; Future  -     Human Immunodeficiency Virus 1 & 2 (HIV-1/HIV-2), Qualitative, RNA  Check routine recommended screen.  16. Cigarette nicotine dependence in remission  -      CT Chest Low Dose Cancer Screening WO; Future  Abstaining from smoking since 2014 with a 40-pack-year history.  Check initial low-dose screening chest CT given now 52 years of age.  17. Chewing tobacco nicotine dependence, uncomplicated  Has reduced his consumption from 3 cans down to 1 can daily.  Encouraged ongoing abstinence.  18. Vasculogenic erectile  dysfunction, unspecified vasculogenic erectile dysfunction type  Patient declines prescription for Viagra or equivalent.  19. Need for immunization against influenza  -     Fluzone Quad >6 mos (Multi-dose) ([id number])  Vaccine updated today.  Encouraged to obtain COVID-19 vaccine update when available.  Status post prior Pneumovax 23.  Does require Prevnar 20 but we are currently out of stock.  Update next visit.       Healthcare Maintenance:  Counseling provided based on age appropriate USPSTF guidelines.  BMI is >= 30 and <35. (Class 1 Obesity). The following options were offered after discussion;: weight loss educational material (shared in after visit summary), exercise counseling/recommendations and nutrition counseling/recommendations    Wisam Sarmiento voices understanding and acceptance of this advice and will call back with any further questions or concerns. AVS with preventive healthcare tips printed for patient.     Follow Up:   Return for Recheck.    At [facility], we believe that sharing information builds trust and better relationships. You are receiving this note because you recently visited [facility]. It is possible you will see health information before a provider has talked with you about it. This kind of information can be easy to misunderstand. To help you fully understand what it means for your health, we urge you to discuss this note with your provider.    Nicho Dinh MD  [facility]

## 2022-09-13 LAB
APPEARANCE UR: CLEAR
BACTERIA #/AREA URNS HPF: NORMAL /[HPF]
BILIRUB UR QL STRIP: NEGATIVE
CASTS URNS QL MICRO: NORMAL /LPF
COLOR UR: YELLOW
EPI CELLS #/AREA URNS HPF: NORMAL /HPF (ref 0–10)
GLUCOSE UR QL STRIP: ABNORMAL
HGB UR QL STRIP: NEGATIVE
KETONES UR QL STRIP: NEGATIVE
LEUKOCYTE ESTERASE UR QL STRIP: NEGATIVE
MICRO URNS: ABNORMAL
MICRO URNS: ABNORMAL
MICROALBUMIN UR-MCNC: 6.6 UG/ML
NITRITE UR QL STRIP: NEGATIVE
PH UR STRIP: 5.5 [PH] (ref 5–7.5)
PROT UR QL STRIP: NEGATIVE
RBC #/AREA URNS HPF: NORMAL /HPF (ref 0–2)
SP GR UR STRIP: 1.02 (ref 1–1.03)
URINALYSIS REFLEX: ABNORMAL
UROBILINOGEN UR STRIP-MCNC: 0.2 MG/DL (ref 0.2–1)
WBC #/AREA URNS HPF: NORMAL /HPF (ref 0–5)

## 2022-09-15 LAB
ALBUMIN SERPL-MCNC: 4.9 G/DL (ref 4–5)
ALBUMIN/GLOB SERPL: 1.7 {RATIO} (ref 1.2–2.2)
ALP SERPL-CCNC: 122 IU/L (ref 44–121)
ALT SERPL-CCNC: 18 IU/L (ref 0–44)
AST SERPL-CCNC: 17 IU/L (ref 0–40)
BILIRUB SERPL-MCNC: 0.4 MG/DL (ref 0–1.2)
BUN SERPL-MCNC: 22 MG/DL (ref 6–24)
BUN/CREAT SERPL: 20 (ref 9–20)
CALCIUM SERPL-MCNC: 9.7 MG/DL (ref 8.7–10.2)
CHLORIDE SERPL-SCNC: 105 MMOL/L (ref 96–106)
CHOLEST SERPL-MCNC: 164 MG/DL (ref 100–199)
CO2 SERPL-SCNC: 18 MMOL/L (ref 20–29)
CREAT SERPL-MCNC: 1.1 MG/DL (ref 0.76–1.27)
EGFRCR-CYS SERPLBLD CKD-EPI 2021: 82 ML/MIN/1.73
GLOBULIN SER CALC-MCNC: 2.9 G/DL (ref 1.5–4.5)
GLUCOSE SERPL-MCNC: 133 MG/DL (ref 65–99)
HBA1C MFR BLD: 7.9 % (ref 4.8–5.6)
HCV AB S/CO SERPL IA: <0.1 S/CO RATIO (ref 0–0.9)
HDLC SERPL-MCNC: 31 MG/DL
HIV 2 RNA SERPL QL NAA+PROBE: NON REACTIVE
HIV1 RNA SERPL QL NAA+PROBE: NON REACTIVE
LDLC SERPL CALC-MCNC: 89 MG/DL (ref 0–99)
POTASSIUM SERPL-SCNC: 4.6 MMOL/L (ref 3.5–5.2)
PROT SERPL-MCNC: 7.8 G/DL (ref 6–8.5)
PSA SERPL-MCNC: 1.5 NG/ML (ref 0–4)
SODIUM SERPL-SCNC: 140 MMOL/L (ref 134–144)
T4 FREE SERPL-MCNC: 0.87 NG/DL (ref 0.82–1.77)
TRIGL SERPL-MCNC: 262 MG/DL (ref 0–149)
TSH SERPL DL<=0.005 MIU/L-ACNC: 4.43 UIU/ML (ref 0.45–4.5)
VLDLC SERPL CALC-MCNC: 44 MG/DL (ref 5–40)

## 2022-09-16 ENCOUNTER — TELEPHONE (OUTPATIENT)
Dept: FAMILY MEDICINE CLINIC | Facility: CLINIC | Age: 52
End: 2022-09-16

## 2022-09-16 NOTE — TELEPHONE ENCOUNTER
Phone conversation with patient reviewing test results from 9/12/2022, basically revealing TSH and free T4 normal, CMP unremarkable other than glucose of 133 in a known diabetic, hemoglobin A1c increased to 7.9% versus previous value of 6.9%, PSA normal, HIV and hepatitis C titers normal, urinalysis with 3+ glucose otherwise normal, urine microalbumin normal, total cholesterol 164, triglyceride 262, HDL 31, LDL 89.    Assessment/plan:  1.  Diabetes mellitus type 2 with suboptimal control.  Patient taking Lantus 70 units at at bedtime, indicating morning blood sugars between 100 120, with his daytime sugars typically between 80 and 130.  He takes Humalog 5 to 7 units with meals.  Advised to increase Lantus by another 5 units, and further titrate by several units until his morning fasting blood glucoses between 80 and 120, then further adjust his Humalog dose by 1 to 2 units to bring down his next Premeal sugar closer to 100.  We will repeat a hemoglobin A1c in 6 months.  Emphasize also healthy lifestyle with diet and exercise.  2.  Hypothyroidism, compensated on current dose of levothyroxine.  Update yearly.  3.  Mixed dyslipidemia, mild suboptimal control.  Likely exacerbated by suboptimal glycemic control.  Continue atorvastatin 80 mg nightly, emphasizing healthy lifestyle, and we will repeat lipid profile in 1 year.  4.  Remainder of laboratory testing satisfactory.

## 2022-09-21 ENCOUNTER — TELEPHONE (OUTPATIENT)
Dept: FAMILY MEDICINE CLINIC | Facility: CLINIC | Age: 52
End: 2022-09-21

## 2022-09-21 DIAGNOSIS — F17.211 CIGARETTE NICOTINE DEPENDENCE IN REMISSION: ICD-10-CM

## 2022-09-21 NOTE — TELEPHONE ENCOUNTER
Nicho Dinh MD  [facility]  Please advise patient that his low-dose screening chest CT was unremarkable with no evidence of any lung cancer.  Recommendation to repeat screening low-dose chest CT in 1 year per standard protocol.       Called and spoke with patient and advised him of results and he verbalized understanding

## 2023-01-01 DIAGNOSIS — E03.9 ACQUIRED HYPOTHYROIDISM: ICD-10-CM

## 2023-01-03 RX ORDER — LEVOTHYROXINE SODIUM 0.05 MG/1
TABLET ORAL
Qty: 90 TABLET | Refills: 0 | Status: SHIPPED | OUTPATIENT
Start: 2023-01-03 | End: 2023-04-07

## 2023-01-29 ENCOUNTER — TRANSCRIBE ORDERS (OUTPATIENT)
Dept: CARDIOLOGY | Facility: CLINIC | Age: 53
End: 2023-01-29
Payer: COMMERCIAL

## 2023-01-29 DIAGNOSIS — I25.10 CORONARY ARTERY DISEASE, UNSPECIFIED VESSEL OR LESION TYPE, UNSPECIFIED WHETHER ANGINA PRESENT, UNSPECIFIED WHETHER NATIVE OR TRANSPLANTED HEART: Primary | ICD-10-CM

## 2023-04-06 DIAGNOSIS — E03.9 ACQUIRED HYPOTHYROIDISM: ICD-10-CM

## 2023-04-07 RX ORDER — ATORVASTATIN CALCIUM 80 MG/1
TABLET, FILM COATED ORAL
Qty: 30 TABLET | Refills: 0 | Status: SHIPPED | OUTPATIENT
Start: 2023-04-07

## 2023-04-07 RX ORDER — EMPAGLIFLOZIN 10 MG/1
TABLET, FILM COATED ORAL
Qty: 30 TABLET | Refills: 0 | Status: SHIPPED | OUTPATIENT
Start: 2023-04-07

## 2023-04-07 RX ORDER — LEVOTHYROXINE SODIUM 0.05 MG/1
TABLET ORAL
Qty: 90 TABLET | Refills: 0 | Status: SHIPPED | OUTPATIENT
Start: 2023-04-07

## 2023-04-13 RX ORDER — CLOPIDOGREL BISULFATE 75 MG/1
TABLET ORAL
Qty: 30 TABLET | Refills: 0 | Status: SHIPPED | OUTPATIENT
Start: 2023-04-13

## 2023-05-03 RX ORDER — EMPAGLIFLOZIN 10 MG/1
TABLET, FILM COATED ORAL
Qty: 30 TABLET | Refills: 0 | Status: SHIPPED | OUTPATIENT
Start: 2023-05-03

## 2023-05-10 ENCOUNTER — TELEPHONE (OUTPATIENT)
Dept: CARDIOLOGY | Facility: CLINIC | Age: 53
End: 2023-05-10

## 2023-05-10 NOTE — TELEPHONE ENCOUNTER
Caller: Bethany Sarmiento    Relationship: Emergency Contact    Best call back number: [phone]    What is the best time to reach you: ANY    Who are you requesting to speak with (clinical staff, provider,  specific staff member): ANY    What was the call regarding: PATIENT WAS RETURNING A CALL TO THE OFFICE. NO VM LEFT OR TE GUIDING ME WHERE TO SEND PATIENT.    Do you require a callback: YES

## 2023-05-18 RX ORDER — ATORVASTATIN CALCIUM 80 MG/1
TABLET, FILM COATED ORAL
Qty: 30 TABLET | Refills: 0 | Status: SHIPPED | OUTPATIENT
Start: 2023-05-18

## 2023-06-05 RX ORDER — EMPAGLIFLOZIN 10 MG/1
TABLET, FILM COATED ORAL
Qty: 30 TABLET | Refills: 3 | Status: SHIPPED | OUTPATIENT
Start: 2023-06-05 | End: 2023-06-09 | Stop reason: SDUPTHER

## 2023-06-09 ENCOUNTER — OFFICE VISIT (OUTPATIENT)
Dept: FAMILY MEDICINE CLINIC | Facility: CLINIC | Age: 53
End: 2023-06-09
Payer: COMMERCIAL

## 2023-06-09 VITALS
HEART RATE: 82 BPM | SYSTOLIC BLOOD PRESSURE: 122 MMHG | OXYGEN SATURATION: 97 % | DIASTOLIC BLOOD PRESSURE: 80 MMHG | TEMPERATURE: 98.6 F | BODY MASS INDEX: 30.13 KG/M2 | HEIGHT: 67 IN | WEIGHT: 192 LBS | RESPIRATION RATE: 18 BRPM

## 2023-06-09 DIAGNOSIS — Z12.11 COLON CANCER SCREENING: ICD-10-CM

## 2023-06-09 DIAGNOSIS — E11.21 TYPE 2 DIABETES MELLITUS WITH DIABETIC NEPHROPATHY, WITH LONG-TERM CURRENT USE OF INSULIN: ICD-10-CM

## 2023-06-09 DIAGNOSIS — I10 PRIMARY HYPERTENSION: ICD-10-CM

## 2023-06-09 DIAGNOSIS — I25.10 CORONARY ARTERY DISEASE INVOLVING NATIVE CORONARY ARTERY OF NATIVE HEART WITHOUT ANGINA PECTORIS: Primary | ICD-10-CM

## 2023-06-09 DIAGNOSIS — I25.2 STATUS POST NON-ST ELEVATION MYOCARDIAL INFARCTION (NSTEMI): ICD-10-CM

## 2023-06-09 DIAGNOSIS — E11.9 INSULIN DEPENDENT TYPE 2 DIABETES MELLITUS: ICD-10-CM

## 2023-06-09 DIAGNOSIS — I65.23 OCCLUSION AND STENOSIS OF BILATERAL CAROTID ARTERIES: ICD-10-CM

## 2023-06-09 DIAGNOSIS — E03.9 ACQUIRED HYPOTHYROIDISM: ICD-10-CM

## 2023-06-09 DIAGNOSIS — Z79.4 TYPE 2 DIABETES MELLITUS WITH DIABETIC NEPHROPATHY, WITH LONG-TERM CURRENT USE OF INSULIN: ICD-10-CM

## 2023-06-09 DIAGNOSIS — E78.2 MIXED HYPERLIPIDEMIA: ICD-10-CM

## 2023-06-09 DIAGNOSIS — F17.211 CIGARETTE NICOTINE DEPENDENCE IN REMISSION: ICD-10-CM

## 2023-06-09 DIAGNOSIS — Z79.4 INSULIN DEPENDENT TYPE 2 DIABETES MELLITUS: ICD-10-CM

## 2023-06-09 DIAGNOSIS — F17.220 CHEWING TOBACCO NICOTINE DEPENDENCE WITHOUT COMPLICATION: ICD-10-CM

## 2023-06-09 LAB
EXPIRATION DATE: NORMAL
GLUCOSE BLDC GLUCOMTR-MCNC: 179 MG/DL (ref 70–130)
HBA1C MFR BLD: 7.8 %
Lab: NORMAL

## 2023-06-09 RX ORDER — CLOPIDOGREL BISULFATE 75 MG/1
75 TABLET ORAL DAILY
Qty: 90 TABLET | Refills: 3 | Status: SHIPPED | OUTPATIENT
Start: 2023-06-09

## 2023-06-09 RX ORDER — LEVOTHYROXINE SODIUM 0.05 MG/1
50 TABLET ORAL DAILY
Qty: 90 TABLET | Refills: 3 | Status: SHIPPED | OUTPATIENT
Start: 2023-06-09

## 2023-06-09 RX ORDER — ATORVASTATIN CALCIUM 80 MG/1
80 TABLET, FILM COATED ORAL EVERY EVENING
Qty: 90 TABLET | Refills: 3 | Status: SHIPPED | OUTPATIENT
Start: 2023-06-09

## 2023-06-09 NOTE — PROGRESS NOTES
"    Follow Up Office Visit      Date: 2023   Patient Name: Wisam Sarmiento  : 1970   MRN: [id number]     Chief Complaint:    Chief Complaint   Patient presents with    Follow-up       History of Present Illness: Wisam Sarmiento is a 53 y.o. male who is here today for follow-up visit, last visit during his complete physical in 2022.  History of coronary artery disease with prior MI and stent placement, with preserved EF, denying chest pains palpitations dyspnea dizziness or edema, to follow-up with Dr. Amber Mayfield in the near future.  He also has bilateral noncritical carotid vascular disease but again no dizziness.  He is a type II diabetic taking Lantus previously having been recommended increase from 70 to 75 units at bedtime, patient actually having reduced down to 60 units indicating that this \"helped my sugars\", also taking Humalog 5 units before every meal, and Jardiance 10 mg daily, but unfortunately not checking blood sugars with any degree of regularity.  Denies any sensory changes in his feet.  Reported current with diabetic eye evaluation.  Patient with hypertension taking lisinopril 40 mg daily and carvedilol 25 mg twice daily with blood pressures reportedly between 120s to 130s over 75-80 with pulse rate in the mid 60s checked twice weekly.  He also takes levothyroxine 50 mcg daily for hypothyroidism, and Lipitor 80 mg daily for dyslipidemia noting testing for both were satisfactory in 2022.  He is an ex cigarette smoker having stopped in  when he had his MI, having had a normal low-dose screening chest CT in 2022, but unfortunately does dip 1 can of tobacco daily.  He also has been recommended to have colon cancer screening at his last visit, choosing a Cologuard study but has not pursued this yet as he is \"scared\" that something bad will be detected.  He denies any abdominal pain nausea vomiting or change in bowel habits.    Subjective      Review of Systems:   Review of " "Systems    I have reviewed the patients family history, social history, past medical history, past surgical history and have updated it as appropriate.     Medications:     Current Outpatient Medications:     aspirin 81 MG EC tablet, Take 1 tablet by mouth Daily., Disp: , Rfl:     atorvastatin (LIPITOR) 80 MG tablet, Take 1 tablet by mouth Every Evening., Disp: 90 tablet, Rfl: 3    carvedilol (COREG) 25 MG tablet, Take 1 tablet by mouth 2 (Two) Times a Day With Meals., Disp: , Rfl:     clopidogrel (PLAVIX) 75 MG tablet, Take 1 tablet by mouth Daily., Disp: 90 tablet, Rfl: 3    empagliflozin (Jardiance) 10 MG tablet tablet, Take 1 tablet by mouth Daily., Disp: 90 tablet, Rfl: 3    HumaLOG KwikPen 100 UNIT/ML solution pen-injector, Inject 5-7 Units under the skin into the appropriate area as directed 3 (Three) Times a Day Before Meals., Disp: 10 mL, Rfl: 5    Lantus SoloStar 100 UNIT/ML injection pen, INJECT 50 TO 80 UNITS SUBCUTANEOUSLY AT BEDTIME AS DIRECTED. (STARTING AT 53 UNITS, THEN INCREASE BY 3 UNITS EVERY 3-4 DAYS UNTIL MORNING FASTING GLUCOSE ), Disp: , Rfl:     levothyroxine (SYNTHROID, LEVOTHROID) 50 MCG tablet, Take 1 tablet by mouth Daily., Disp: 90 tablet, Rfl: 3    lisinopril (PRINIVIL,ZESTRIL) 40 MG tablet, , Disp: , Rfl:     nitroglycerin (NITROSTAT) 0.4 MG SL tablet, Place 1 tablet under the tongue Every 5 (Five) Minutes As Needed for Chest Pain. Take no more than 3 doses in 15 minutes., Disp: , Rfl:     omeprazole (priLOSEC) 20 MG capsule, Take 1 capsule by mouth Daily., Disp: 90 capsule, Rfl: 3    Allergies:   Allergies   Allergen Reactions    Penicillins Unknown - Low Severity       Objective     Physical Exam: Please see above  Vital Signs:   Vitals:    06/09/23 1323   BP: 122/80   BP Location: Left arm   Patient Position: Sitting   Cuff Size: Adult   Pulse: 82   Resp: 18   Temp: 98.6 °F (37 °C)   TempSrc: Temporal   SpO2: 97%   Weight: 87.1 kg (192 lb)   Height: 170.2 cm (67\")     Body " mass index is 30.07 kg/m².  BMI is >= 30 and <35. (Class 1 Obesity). The following options were offered after discussion;: exercise counseling/recommendations and nutrition counseling/recommendations       Physical Exam  General: Very pleasant healthy-appearing 53-year-old male, BMI slightly elevated at 30.0 though he does have a muscular body build.  Alert and oriented.  Head and neck: Oropharynx reveals an upper denture plate with residual anterior mandibular dentition in fair condition, no mucosal abnormalities, neck supple with no adenopathy masses or tenderness  Lungs are clear with no wheeze tachypnea or cough  Cardiac regular rate rhythm with no murmurs gallops or rubs, no dependent edema  Bipedal exam with 2+ DP and 2+ PT pulses bilaterally, no edema, normal sensation in both feet to fine touch vibration and pinprick with motor exam normal  Diabetic Foot Exam Performed and Monofilament Test Performed   Procedures    Results:   Labs:   Hemoglobin A1C   Date Value Ref Range Status   06/09/2023 7.8 % Final   06/09/2020 8.20 (H) 4.80 - 5.60 % Final     TSH   Date Value Ref Range Status   09/12/2022 4.430 0.450 - 4.500 uIU/mL Final        POCT Results (if applicable):   Results for orders placed or performed in visit on 06/09/23   POC Glycosylated Hemoglobin (Hb A1C)    Specimen: Blood   Result Value Ref Range    Hemoglobin A1C 7.8 %    Lot Number [id number]     Expiration Date 2,282,025    POC Glucose    Specimen: Blood   Result Value Ref Range    Glucose 179 (A) 70 - 130 mg/dL       Imaging:   No valid procedures specified.       Assessment / Plan      Assessment/Plan:   Diagnoses and all orders for this visit:    1. Coronary artery disease involving native coronary artery of native heart without angina pectoris (Primary)  -     empagliflozin (Jardiance) 10 MG tablet tablet; Take 1 tablet by mouth Daily.  Dispense: 90 tablet; Refill: 3  -     clopidogrel (PLAVIX) 75 MG tablet; Take 1 tablet by mouth Daily.   Dispense: 90 tablet; Refill: 3  Status post MI with VIVIAN placement in 2014.  Patient reports no concerning symptoms of chest pains palpitations dyspnea dizziness or edema.  History of preserved EF.  Continuing risk factor modification including use of Plavix, aspirin, carvedilol, Jardiance, and lisinopril, and continues to abstain from cigarette smoking since 2014.  Encouraged ongoing regular exercise and healthy lifestyle.  He follows up with Dr. Amber Mayfield in the near future.  2. Status post non-ST elevation myocardial infarction (NSTEMI)  -     empagliflozin (Jardiance) 10 MG tablet tablet; Take 1 tablet by mouth Daily.  Dispense: 90 tablet; Refill: 3  -     clopidogrel (PLAVIX) 75 MG tablet; Take 1 tablet by mouth Daily.  Dispense: 90 tablet; Refill: 3  See above.  Asymptomatic at this time.  3. Occlusion and stenosis of bilateral carotid arteries  Asymptomatic.  To follow-up with cardiology in the near future anticipating updated carotid Dopplers at that time.  4. Insulin dependent type 2 diabetes mellitus  -     empagliflozin (Jardiance) 10 MG tablet tablet; Take 1 tablet by mouth Daily.  Dispense: 90 tablet; Refill: 3  No known secondary sequelae.  Patient unfortunately continues to have suboptimal glycemic control with a hemoglobin A1c today of 7.8% compared to previous value of 7.9% in 9/2022.  He did for unknown reason reduce his previous Lantus dosing from 70 units down to 60 units nightly rather than increased to 75 units as I had discussed, continuing Humalog 5 units before every meal and Jardiance 10 mg daily.  Unfortunately he has not been checking blood sugars for many months now and we really have no idea of his day-to-day glycemic control, noting hemoglobin A1c of 7.8%.  I advised him of the importance of monitoring blood sugars at least daily mornings and periodically before meals so that we have more information, and also advised him again to increase his Lantus back up to his 70 units nightly,  "then further increase by 3 to 5 units every 3 to 4 days until his morning fasting glucose is at least less than 120.  Repeat hemoglobin A1c in 3 months.  Continues ACE inhibitor.  5. Type 2 diabetes mellitus with diabetic nephropathy, with long-term current use of insulin  -     POC Glycosylated Hemoglobin (Hb A1C)  -     POC Glucose  See above.  Previous renal sufficiency has most recently normalized.  Will continue him on ACE inhibitor.  Plan repeat testing at follow-up complete physical in 3 months.  6. Primary hypertension  Satisfactory blood pressure control acutely as well as reportedly chronically taking regimen of lisinopril 40 mg daily and carvedilol 25 mg twice daily.  Continue current regimen.  7. Mixed hyperlipidemia  -     atorvastatin (LIPITOR) 80 MG tablet; Take 1 tablet by mouth Every Evening.  Dispense: 90 tablet; Refill: 3  Continues on atorvastatin 80 mg nightly with overall satisfactory lipid profile in 9/2022 other than mild elevation of triglycerides and low HDL.  Continue healthy lifestyle.  Update testing at next visit.  8. Acquired hypothyroidism  -     levothyroxine (SYNTHROID, LEVOTHROID) 50 MCG tablet; Take 1 tablet by mouth Daily.  Dispense: 90 tablet; Refill: 3  Currently taking levothyroxine 50 mcg daily with normal TSH and free T4 in 9/2022.  We will update at his upcoming complete physical.  9.  Chewing tobacco nicotine dependence without complication  Strongly advise regarding need to discontinue his smokeless tobacco use for health risk reduction, noting he previously stopped smoking in 2014.  10.  Cigarette nicotine dependence in remission  Abstaining from smoking since 2014.  He is current with low-dose screening chest CT from 9/21/2022.  Update yearly.  11.  Colon cancer screening.  Patient in 9/2022 was advised of the importance of initiating colon cancer screening.  He had chosen at the time Cologuard screen but unfortunately has not pursued this given \"scared\".  I advised him " again of the significant importance of pursuing the screening test, and he indicates that he will now do so.    Follow Up:   Return in about 3 months (around 9/9/2023) for Annual physical.      At [facility], we believe that sharing information builds trust and better relationships. You are receiving this note because you recently visited [facility]. It is possible you will see health information before a provider has talked with you about it. This kind of information can be easy to misunderstand. To help you fully understand what it means for your health, we urge you to discuss this note with your provider.    Nicho Dinh MD  [facility]

## 2023-06-21 PROBLEM — Z72.0 TOBACCO USE: Status: ACTIVE | Noted: 2023-06-21

## 2023-07-28 RX ORDER — INSULIN GLARGINE 100 [IU]/ML
50 INJECTION, SOLUTION SUBCUTANEOUS NIGHTLY
Qty: 72 ML | Refills: 6 | Status: SHIPPED | OUTPATIENT
Start: 2023-07-28

## 2023-09-28 RX ORDER — PEN NEEDLE, DIABETIC 32GX 5/32"
NEEDLE, DISPOSABLE MISCELLANEOUS
Qty: 120 EACH | Refills: 6 | Status: SHIPPED | OUTPATIENT
Start: 2023-09-28

## 2023-10-03 ENCOUNTER — TELEPHONE (OUTPATIENT)
Dept: FAMILY MEDICINE CLINIC | Facility: CLINIC | Age: 53
End: 2023-10-03
Payer: COMMERCIAL

## 2023-10-03 RX ORDER — PEN NEEDLE, DIABETIC 32GX 5/32"
NEEDLE, DISPOSABLE MISCELLANEOUS
Qty: 120 EACH | Refills: 6 | Status: SHIPPED | OUTPATIENT
Start: 2023-10-03

## 2023-10-04 DIAGNOSIS — I25.10 CORONARY ARTERY DISEASE INVOLVING NATIVE CORONARY ARTERY OF NATIVE HEART WITHOUT ANGINA PECTORIS: ICD-10-CM

## 2023-10-04 DIAGNOSIS — I25.2 STATUS POST NON-ST ELEVATION MYOCARDIAL INFARCTION (NSTEMI): ICD-10-CM

## 2023-10-04 DIAGNOSIS — E11.9 INSULIN DEPENDENT TYPE 2 DIABETES MELLITUS: ICD-10-CM

## 2023-10-04 DIAGNOSIS — Z79.4 INSULIN DEPENDENT TYPE 2 DIABETES MELLITUS: ICD-10-CM

## 2023-10-05 RX ORDER — EMPAGLIFLOZIN 10 MG/1
TABLET, FILM COATED ORAL
Qty: 30 TABLET | Refills: 0 | Status: SHIPPED | OUTPATIENT
Start: 2023-10-05

## 2023-11-11 DIAGNOSIS — K21.00 GASTROESOPHAGEAL REFLUX DISEASE WITH ESOPHAGITIS WITHOUT HEMORRHAGE: ICD-10-CM

## 2023-11-13 RX ORDER — OMEPRAZOLE 20 MG/1
20 CAPSULE, DELAYED RELEASE ORAL DAILY
Qty: 90 CAPSULE | Refills: 0 | Status: SHIPPED | OUTPATIENT
Start: 2023-11-13

## 2023-12-25 DIAGNOSIS — I10 PRIMARY HYPERTENSION: ICD-10-CM

## 2023-12-25 DIAGNOSIS — I25.10 CORONARY ARTERY DISEASE INVOLVING NATIVE CORONARY ARTERY OF NATIVE HEART WITHOUT ANGINA PECTORIS: ICD-10-CM

## 2023-12-26 RX ORDER — CARVEDILOL 25 MG/1
25 TABLET ORAL 2 TIMES DAILY WITH MEALS
Qty: 90 TABLET | Refills: 0 | Status: SHIPPED | OUTPATIENT
Start: 2023-12-26

## 2024-02-08 DIAGNOSIS — K21.00 GASTROESOPHAGEAL REFLUX DISEASE WITH ESOPHAGITIS WITHOUT HEMORRHAGE: ICD-10-CM

## 2024-02-08 RX ORDER — OMEPRAZOLE 20 MG/1
20 CAPSULE, DELAYED RELEASE ORAL DAILY
Qty: 90 CAPSULE | Refills: 0 | Status: SHIPPED | OUTPATIENT
Start: 2024-02-08

## 2024-02-10 DIAGNOSIS — I10 PRIMARY HYPERTENSION: ICD-10-CM

## 2024-02-10 DIAGNOSIS — I25.10 CORONARY ARTERY DISEASE INVOLVING NATIVE CORONARY ARTERY OF NATIVE HEART WITHOUT ANGINA PECTORIS: ICD-10-CM

## 2024-02-12 RX ORDER — LISINOPRIL 40 MG/1
40 TABLET ORAL DAILY
Qty: 90 TABLET | Refills: 3 | OUTPATIENT
Start: 2024-02-12

## 2024-02-12 RX ORDER — CARVEDILOL 25 MG/1
25 TABLET ORAL 2 TIMES DAILY WITH MEALS
Qty: 90 TABLET | Refills: 0 | Status: SHIPPED | OUTPATIENT
Start: 2024-02-12

## 2024-03-08 RX ORDER — LISINOPRIL 40 MG/1
40 TABLET ORAL DAILY
Qty: 30 TABLET | Refills: 0 | Status: SHIPPED | OUTPATIENT
Start: 2024-03-08

## 2024-03-23 DIAGNOSIS — I10 PRIMARY HYPERTENSION: ICD-10-CM

## 2024-03-23 DIAGNOSIS — I25.10 CORONARY ARTERY DISEASE INVOLVING NATIVE CORONARY ARTERY OF NATIVE HEART WITHOUT ANGINA PECTORIS: ICD-10-CM

## 2024-03-26 RX ORDER — CARVEDILOL 25 MG/1
25 TABLET ORAL 2 TIMES DAILY WITH MEALS
Qty: 90 TABLET | Refills: 0 | OUTPATIENT
Start: 2024-03-26

## 2024-03-29 DIAGNOSIS — I25.10 CORONARY ARTERY DISEASE INVOLVING NATIVE CORONARY ARTERY OF NATIVE HEART WITHOUT ANGINA PECTORIS: ICD-10-CM

## 2024-03-29 DIAGNOSIS — I10 PRIMARY HYPERTENSION: ICD-10-CM

## 2024-04-01 RX ORDER — LISINOPRIL 40 MG/1
40 TABLET ORAL DAILY
Qty: 30 TABLET | Refills: 0 | Status: SHIPPED | OUTPATIENT
Start: 2024-04-01

## 2024-04-09 DIAGNOSIS — I25.10 CORONARY ARTERY DISEASE INVOLVING NATIVE CORONARY ARTERY OF NATIVE HEART WITHOUT ANGINA PECTORIS: ICD-10-CM

## 2024-04-09 DIAGNOSIS — I10 PRIMARY HYPERTENSION: ICD-10-CM

## 2024-04-09 RX ORDER — CARVEDILOL 25 MG/1
25 TABLET ORAL 2 TIMES DAILY WITH MEALS
Qty: 90 TABLET | Refills: 1 | Status: SHIPPED | OUTPATIENT
Start: 2024-04-09

## 2024-04-09 RX ORDER — CARVEDILOL 25 MG/1
25 TABLET ORAL 2 TIMES DAILY WITH MEALS
Qty: 90 TABLET | Refills: 0 | OUTPATIENT
Start: 2024-04-09

## 2024-04-29 RX ORDER — LISINOPRIL 40 MG/1
40 TABLET ORAL DAILY
Qty: 90 TABLET | Refills: 3 | Status: SHIPPED | OUTPATIENT
Start: 2024-04-29

## 2024-05-08 DIAGNOSIS — K21.00 GASTROESOPHAGEAL REFLUX DISEASE WITH ESOPHAGITIS WITHOUT HEMORRHAGE: ICD-10-CM

## 2024-05-08 RX ORDER — OMEPRAZOLE 20 MG/1
20 CAPSULE, DELAYED RELEASE ORAL DAILY
Qty: 30 CAPSULE | Refills: 0 | Status: SHIPPED | OUTPATIENT
Start: 2024-05-08

## 2024-06-11 DIAGNOSIS — K21.00 GASTROESOPHAGEAL REFLUX DISEASE WITH ESOPHAGITIS WITHOUT HEMORRHAGE: ICD-10-CM

## 2024-06-11 RX ORDER — OMEPRAZOLE 20 MG/1
20 CAPSULE, DELAYED RELEASE ORAL DAILY
Qty: 30 CAPSULE | Refills: 0 | OUTPATIENT
Start: 2024-06-11

## 2024-06-11 NOTE — TELEPHONE ENCOUNTER
Pt has been advised multiple times he needs an appt for refills. RX denied at this time. He must schedule annual physical for further refills.

## 2024-06-22 DIAGNOSIS — I25.10 CORONARY ARTERY DISEASE INVOLVING NATIVE CORONARY ARTERY OF NATIVE HEART WITHOUT ANGINA PECTORIS: ICD-10-CM

## 2024-06-22 DIAGNOSIS — E03.9 ACQUIRED HYPOTHYROIDISM: ICD-10-CM

## 2024-06-24 RX ORDER — LEVOTHYROXINE SODIUM 0.05 MG/1
50 TABLET ORAL DAILY
Qty: 90 TABLET | Refills: 0 | Status: SHIPPED | OUTPATIENT
Start: 2024-06-24

## 2024-06-24 RX ORDER — CLOPIDOGREL BISULFATE 75 MG/1
75 TABLET ORAL DAILY
Qty: 90 TABLET | Refills: 0 | Status: SHIPPED | OUTPATIENT
Start: 2024-06-24

## 2024-06-30 DIAGNOSIS — I25.10 CORONARY ARTERY DISEASE INVOLVING NATIVE CORONARY ARTERY OF NATIVE HEART WITHOUT ANGINA PECTORIS: ICD-10-CM

## 2024-06-30 DIAGNOSIS — I10 PRIMARY HYPERTENSION: ICD-10-CM

## 2024-07-01 RX ORDER — CARVEDILOL 25 MG/1
25 TABLET ORAL 2 TIMES DAILY WITH MEALS
Qty: 90 TABLET | Refills: 0 | Status: SHIPPED | OUTPATIENT
Start: 2024-07-01

## 2024-07-21 DIAGNOSIS — K21.00 GASTROESOPHAGEAL REFLUX DISEASE WITH ESOPHAGITIS WITHOUT HEMORRHAGE: ICD-10-CM

## 2024-07-22 RX ORDER — OMEPRAZOLE 20 MG/1
20 CAPSULE, DELAYED RELEASE ORAL DAILY
Qty: 30 CAPSULE | Refills: 3 | Status: SHIPPED | OUTPATIENT
Start: 2024-07-22

## 2024-07-24 ENCOUNTER — OFFICE VISIT (OUTPATIENT)
Dept: CARDIOLOGY | Facility: CLINIC | Age: 54
End: 2024-07-24
Payer: COMMERCIAL

## 2024-07-24 VITALS
OXYGEN SATURATION: 96 % | BODY MASS INDEX: 28.49 KG/M2 | WEIGHT: 188 LBS | SYSTOLIC BLOOD PRESSURE: 140 MMHG | HEIGHT: 68 IN | DIASTOLIC BLOOD PRESSURE: 80 MMHG | HEART RATE: 81 BPM

## 2024-07-24 DIAGNOSIS — I10 PRIMARY HYPERTENSION: ICD-10-CM

## 2024-07-24 DIAGNOSIS — E78.2 MIXED HYPERLIPIDEMIA: ICD-10-CM

## 2024-07-24 DIAGNOSIS — I77.9 MILD CAROTID ARTERY DISEASE: ICD-10-CM

## 2024-07-24 DIAGNOSIS — I25.10 CORONARY ARTERY DISEASE INVOLVING NATIVE CORONARY ARTERY OF NATIVE HEART WITHOUT ANGINA PECTORIS: ICD-10-CM

## 2024-07-24 PROCEDURE — 99214 OFFICE O/P EST MOD 30 MIN: CPT | Performed by: NURSE PRACTITIONER

## 2024-07-24 PROCEDURE — 93000 ELECTROCARDIOGRAM COMPLETE: CPT | Performed by: NURSE PRACTITIONER

## 2024-07-24 RX ORDER — CLOPIDOGREL BISULFATE 75 MG/1
75 TABLET ORAL DAILY
Qty: 90 TABLET | Refills: 1 | Status: SHIPPED | OUTPATIENT
Start: 2024-07-24

## 2024-07-24 RX ORDER — NITROGLYCERIN 0.4 MG/1
0.4 TABLET SUBLINGUAL
Qty: 25 TABLET | Refills: 11 | Status: SHIPPED | OUTPATIENT
Start: 2024-07-24

## 2024-07-24 RX ORDER — CARVEDILOL 25 MG/1
25 TABLET ORAL 2 TIMES DAILY WITH MEALS
Qty: 180 TABLET | Refills: 1 | Status: SHIPPED | OUTPATIENT
Start: 2024-07-24

## 2024-07-24 RX ORDER — LISINOPRIL 40 MG/1
40 TABLET ORAL DAILY
Qty: 90 TABLET | Refills: 1 | Status: SHIPPED | OUTPATIENT
Start: 2024-07-24

## 2024-07-24 RX ORDER — ATORVASTATIN CALCIUM 80 MG/1
80 TABLET, FILM COATED ORAL EVERY EVENING
Qty: 90 TABLET | Refills: 1 | Status: SHIPPED | OUTPATIENT
Start: 2024-07-24

## 2024-07-24 NOTE — PROGRESS NOTES
[facility] Provider Note     Date:   2024   Name: Wisam Sarmiento  :   1970  PCP: Nicho Dinh MD    Chief Complaint   Patient presents with    Coronary Artery Disease    Hypertension       Subjective     History of Present Illness  Wisam Sarmiento is a 54 y.o. male who presents today for follow-up on CAD, HTN, and HPL.  Last visit he had ran out of Lisinopril but his blood pressure was doing so well that we decided to keep him off it.  Today he says his blood pressure started creeping up so he restarted Lisinopril (refill sent in from our office).  With medication his blood pressure runs 117-122/ upper 70's.  He has been out of Lisinopril again for several weeks so he called and got a follow-up appt for refills.      We will restart/refill Lisinopril 40mg daily.  Continue Carvedilol.  I have asked him to continue to check his BP regularly at home and to let me know if BP continues to be elevated and/or gets below 115/70 or of he has any dizziness.    He denies any chest pain, SOA, edema, palpitations, dizziness, or syncope.  EKG updated today.  He will be getting updated labs with Dr. Dinh soon.    No bleeding issues with Plavix.  Is requesting a refill.    History of daytime sleepiness.  Declines HST.  Do not drive sleepy.    We will see him back in 6 months with carotids.  Sooner if needed.    Cardiology and sleep related problem list    Hypertension  Hyperlipidemia  Hypothyroidism  Diabetes  Chest pain  GERD  Carotid stenosis  CAD-MI RCA stent VIVIAN  ; cath Xience stent OM 2 2020  Hypoxemia at night  Former smoker-2 packs/day quit 2015 x 20 years  Current tobacco chewing  CKD    Family history Father 56 MI and history of blood clots, mother 63 with PVD and stent, sister had stroke with childbirth in 30s    2024 EKG-NSR. HR 69    2023 echo-benign      Allergies   Allergen Reactions    Penicillins Unknown - Low Severity       Current Outpatient  Medications:     aspirin 81 MG EC tablet, Take 1 tablet by mouth Daily., Disp: , Rfl:     atorvastatin (LIPITOR) 80 MG tablet, Take 1 tablet by mouth Every Evening., Disp: 90 tablet, Rfl: 1    carvedilol (COREG) 25 MG tablet, Take 1 tablet by mouth 2 (Two) Times a Day With Meals., Disp: 180 tablet, Rfl: 1    clopidogrel (PLAVIX) 75 MG tablet, Take 1 tablet by mouth Daily., Disp: 90 tablet, Rfl: 1    HumaLOG KwikPen 100 UNIT/ML solution pen-injector, Inject 5-7 Units under the skin into the appropriate area as directed 3 (Three) Times a Day Before Meals., Disp: 10 mL, Rfl: 5    Insulin Pen Needle (ReliOn Pen Needles) 31G X 6 MM misc, Check twice daily E11.9, Disp: 120 each, Rfl: 6    Jardiance 10 MG tablet tablet, Take 1 tablet by mouth once daily, Disp: 30 tablet, Rfl: 0    Lantus SoloStar 100 UNIT/ML injection pen, Inject 50 Units under the skin into the appropriate area as directed Every Night. Inject 50-80 units nightly at bedtime. Starting at 53 units, then increase by 3 units every 3-4 days until morning fasting glucose ., Disp: 72 mL, Rfl: 6    levothyroxine (SYNTHROID, LEVOTHROID) 50 MCG tablet, Take 1 tablet by mouth once daily, Disp: 90 tablet, Rfl: 0    lisinopril (PRINIVIL,ZESTRIL) 40 MG tablet, Take 1 tablet by mouth Daily., Disp: 90 tablet, Rfl: 1    nitroglycerin (NITROSTAT) 0.4 MG SL tablet, Place 1 tablet under the tongue Every 5 (Five) Minutes As Needed for Chest Pain. Take no more than 3 doses in 15 minutes., Disp: 25 tablet, Rfl: 11    omeprazole (priLOSEC) 20 MG capsule, Take 1 capsule by mouth once daily, Disp: 30 capsule, Rfl: 3    Past Medical History:   Diagnosis Date    Asthma     Chronic kidney disease, stage 3a     Corns and callosity     Coronary artery disease     Disease of thyroid gland     Gastro-esophageal reflux disease with esophagitis     Hydrocele, right     Hyperlipidemia     Hypertension     Hypothyroidism, unspecified     Insulin dependent type 2 diabetes mellitus      "Left forearm fracture     Left wrist fracture     Lumbago with sciatica, left side     Meralgia paresthetica, right lower limb     MI (myocardial infarction)     Occlusion and stenosis of bilateral carotid arteries     Onychomycosis     RIGHT FOURTH TOENAIL    Overweight     Right clavicle fracture     Status post non-ST elevation myocardial infarction (NSTEMI)     Type 2 diabetes mellitus with diabetic nephropathy       Past Surgical History:   Procedure Laterality Date    APPENDECTOMY      CARDIAC CATHETERIZATION      CARDIAC CATHETERIZATION N/A 2020    Procedure: Left Heart Cath;  Surgeon: Pietro Watters MD;  Location: [facility] CATH INVASIVE LOCATION;  Service: Cardiovascular;  Laterality: N/A;    CORONARY STENT PLACEMENT      to an occluded RCA at the [facility], ejection fraction by echocardiogram at that time 40-50%     Family History   Problem Relation Age of Onset    Hypertension Mother     Diabetes Mother     Heart disease Father     Heart attack Father     Diabetes Father     Hypertension Father     Hyperlipidemia Father     Stroke Sister         post childbirth strok    No Known Problems Sister      Social History     Socioeconomic History    Marital status:    Tobacco Use    Smoking status: Former     Current packs/day: 0.00     Average packs/day: 2.0 packs/day for 28.0 years (56.0 ttl pk-yrs)     Types: Cigarettes     Start date:      Quit date:      Years since quittin.5     Passive exposure: Past    Smokeless tobacco: Current    Tobacco comments:     1-3 cans per day since 2016   Vaping Use    Vaping status: Never Used   Substance and Sexual Activity    Alcohol use: Never     Comment: no alcohol x4+ years, previous light social drinker    Drug use: Never    Sexual activity: Yes     Partners: Female       Objective     Vital Signs:  /80   Pulse 81   Ht 172.7 cm (68\")   Wt 85.3 kg (188 lb)   SpO2 96%   BMI 28.59 kg/m²   Estimated body mass index is 28.59 " "kg/m² as calculated from the following:    Height as of this encounter: 172.7 cm (68\").    Weight as of this encounter: 85.3 kg (188 lb).         Physical Exam  Constitutional:       Appearance: Normal appearance. He is well-developed.   HENT:      Head: Normocephalic and atraumatic.   Eyes:      General: No scleral icterus.     Pupils: Pupils are equal, round, and reactive to light.   Cardiovascular:      Rate and Rhythm: Normal rate and regular rhythm.      Heart sounds: Normal heart sounds. No murmur heard.  Pulmonary:      Breath sounds: Normal breath sounds. No wheezing or rhonchi.   Musculoskeletal:         General: Normal range of motion.      Right lower leg: No edema.      Left lower leg: No edema.   Skin:     General: Skin is warm and dry.      Capillary Refill: Capillary refill takes less than 2 seconds.      Coloration: Skin is not cyanotic.      Nails: There is no clubbing.   Neurological:      Mental Status: He is alert and oriented to person, place, and time.      Motor: No weakness.      Gait: Gait normal.   Psychiatric:         Mood and Affect: Mood normal.         Behavior: Behavior is cooperative.         Thought Content: Thought content normal.         Cognition and Memory: Memory normal.                 ECG 12 Lead    Date/Time: 7/24/2024 10:14 AM  Performed by: Shanelle Greer APRN    Authorized by: Shanelle Greer APRN  Comparison: compared with previous ECG from 12/12/2022  Similar to previous ECG  Rhythm: sinus rhythm  BPM: 69    Clinical impression: normal ECG           Assessment and Plan     Diagnoses and all orders for this visit:    1. Mild carotid artery disease  Comments:  Will update carotids at follow-up  Orders:  -     Duplex Carotid Ultrasound CAR; Future    2. Mixed hyperlipidemia  Comments:  Refill statin.  Plans to get labs with PCP soon.  Orders:  -     atorvastatin (LIPITOR) 80 MG tablet; Take 1 tablet by mouth Every Evening.  Dispense: 90 tablet; Refill: " 1    3. Coronary artery disease involving native coronary artery of native heart without angina pectoris  Comments:  On medical therapy.  Stable.  No chest pain.  Updated EKG today  Orders:  -     carvedilol (COREG) 25 MG tablet; Take 1 tablet by mouth 2 (Two) Times a Day With Meals.  Dispense: 180 tablet; Refill: 1  -     clopidogrel (PLAVIX) 75 MG tablet; Take 1 tablet by mouth Daily.  Dispense: 90 tablet; Refill: 1  -     ECG 12 Lead    4. Primary hypertension  Comments:  At goal when he has lisinopril but recently ran out again.  Will refill.  Also continue carvedilol.  Keep blood pressure log at home.  Orders:  -     carvedilol (COREG) 25 MG tablet; Take 1 tablet by mouth 2 (Two) Times a Day With Meals.  Dispense: 180 tablet; Refill: 1    Other orders  -     lisinopril (PRINIVIL,ZESTRIL) 40 MG tablet; Take 1 tablet by mouth Daily.  Dispense: 90 tablet; Refill: 1  -     nitroglycerin (NITROSTAT) 0.4 MG SL tablet; Place 1 tablet under the tongue Every 5 (Five) Minutes As Needed for Chest Pain. Take no more than 3 doses in 15 minutes.  Dispense: 25 tablet; Refill: 11        Recommendations: ER if symptoms increase, Report if any new/changing symptoms immediately, Limit salt, Limit caffeine, Exercise recommendations discussed, Discussed mediterranean diet, Sleep risks reviewed (driving, medical, sleep death, sedating agents), and Sleep hygiene discussed      Follow Up  Return in about 6 months (around 1/24/2025) for CAD/HTN/HLP with carotids.    Shanelle Greer, LEEANN   07/24/2024     Please note that this explicitly excludes time spent on other separate billable services such as performing procedures or test interpretation, when applicable.    This note was created using dictation software which occasionally transcribes nonsensical phrases. Please contact the provider if any clarification is needed.

## 2024-07-26 ENCOUNTER — OFFICE VISIT (OUTPATIENT)
Dept: FAMILY MEDICINE CLINIC | Facility: CLINIC | Age: 54
End: 2024-07-26
Payer: COMMERCIAL

## 2024-07-26 VITALS
DIASTOLIC BLOOD PRESSURE: 82 MMHG | TEMPERATURE: 97.8 F | BODY MASS INDEX: 28.55 KG/M2 | SYSTOLIC BLOOD PRESSURE: 126 MMHG | OXYGEN SATURATION: 96 % | WEIGHT: 188.4 LBS | HEIGHT: 68 IN | HEART RATE: 72 BPM

## 2024-07-26 DIAGNOSIS — I10 PRIMARY HYPERTENSION: ICD-10-CM

## 2024-07-26 DIAGNOSIS — I25.2 STATUS POST NON-ST ELEVATION MYOCARDIAL INFARCTION (NSTEMI): ICD-10-CM

## 2024-07-26 DIAGNOSIS — I25.10 CORONARY ARTERY DISEASE INVOLVING NATIVE CORONARY ARTERY OF NATIVE HEART WITHOUT ANGINA PECTORIS: ICD-10-CM

## 2024-07-26 DIAGNOSIS — E55.9 VITAMIN D DEFICIENCY: ICD-10-CM

## 2024-07-26 DIAGNOSIS — K21.00 GASTROESOPHAGEAL REFLUX DISEASE WITH ESOPHAGITIS WITHOUT HEMORRHAGE: ICD-10-CM

## 2024-07-26 DIAGNOSIS — E11.65 INADEQUATELY CONTROLLED DIABETES MELLITUS: ICD-10-CM

## 2024-07-26 DIAGNOSIS — Z12.11 SCREEN FOR COLON CANCER: ICD-10-CM

## 2024-07-26 DIAGNOSIS — Z95.5 H/O HEART ARTERY STENT: ICD-10-CM

## 2024-07-26 DIAGNOSIS — F17.220 CHEWING TOBACCO NICOTINE DEPENDENCE WITHOUT COMPLICATION: ICD-10-CM

## 2024-07-26 DIAGNOSIS — E11.9 INSULIN DEPENDENT TYPE 2 DIABETES MELLITUS: ICD-10-CM

## 2024-07-26 DIAGNOSIS — Z00.01 ENCOUNTER FOR GENERAL ADULT MEDICAL EXAMINATION WITH ABNORMAL FINDINGS: Primary | ICD-10-CM

## 2024-07-26 DIAGNOSIS — Z79.4 INSULIN DEPENDENT TYPE 2 DIABETES MELLITUS: ICD-10-CM

## 2024-07-26 DIAGNOSIS — E66.3 OVERWEIGHT: ICD-10-CM

## 2024-07-26 DIAGNOSIS — Z12.2 SCREENING FOR LUNG CANCER: ICD-10-CM

## 2024-07-26 DIAGNOSIS — E03.9 ACQUIRED HYPOTHYROIDISM: ICD-10-CM

## 2024-07-26 DIAGNOSIS — F17.211 CIGARETTE NICOTINE DEPENDENCE IN REMISSION: ICD-10-CM

## 2024-07-26 DIAGNOSIS — N50.89 SCROTAL MASS: ICD-10-CM

## 2024-07-26 DIAGNOSIS — Z23 NEED FOR PROPHYLACTIC VACCINATION AGAINST STREPTOCOCCUS PNEUMONIAE (PNEUMOCOCCUS): ICD-10-CM

## 2024-07-26 DIAGNOSIS — E78.2 MIXED HYPERLIPIDEMIA: ICD-10-CM

## 2024-07-26 DIAGNOSIS — Z12.5 SCREENING FOR PROSTATE CANCER: ICD-10-CM

## 2024-07-26 PROBLEM — E11.21 TYPE 2 DIABETES MELLITUS WITH DIABETIC NEPHROPATHY: Status: RESOLVED | Noted: 2022-08-24 | Resolved: 2024-07-26

## 2024-07-26 PROBLEM — Z13.29 SCREENING FOR THYROID DISORDER: Status: ACTIVE | Noted: 2024-07-26

## 2024-07-26 LAB
EXPIRATION DATE: ABNORMAL
EXPIRATION DATE: ABNORMAL
GLUCOSE BLDC GLUCOMTR-MCNC: 163 MG/DL (ref 70–130)
HBA1C MFR BLD: 8.9 % (ref 4.5–5.7)
Lab: ABNORMAL
Lab: ABNORMAL

## 2024-07-26 RX ORDER — ACYCLOVIR 400 MG/1
3 TABLET ORAL 3 TIMES DAILY
Qty: 6 EACH | Refills: 2 | Status: SHIPPED | OUTPATIENT
Start: 2024-07-26

## 2024-07-26 RX ORDER — ACYCLOVIR 400 MG/1
1 TABLET ORAL 3 TIMES DAILY
Qty: 1 EACH | Refills: 0 | Status: SHIPPED | OUTPATIENT
Start: 2024-07-26

## 2024-07-26 RX ORDER — EMPAGLIFLOZIN 10 MG/1
TABLET, FILM COATED ORAL
Qty: 30 TABLET | Refills: 3 | Status: SHIPPED | OUTPATIENT
Start: 2024-07-26

## 2024-07-26 NOTE — PROGRESS NOTES
Venipuncture Blood Specimen Collection  Venipuncture performed in right arm by Bridgette Justice MA with good hemostasis. Patient tolerated the procedure well without complications.   07/26/24   Bridgette Justice MA

## 2024-07-26 NOTE — ASSESSMENT & PLAN NOTE
VIVIAN to the RCA in 2016, VIVIAN to the OM in 2020, continue risk factor modification including aspirin, Plavix, and statin.

## 2024-07-26 NOTE — ASSESSMENT & PLAN NOTE
Niccoli controlled taking carvedilol 25 mg twice daily, with recent reinitiation of his lisinopril 40 mg daily.

## 2024-07-26 NOTE — ASSESSMENT & PLAN NOTE
Status post non-STEMI 2016 status post VIVIAN to the RCA, repeat MI 2020 with VIVIAN to the OM, echo cardiogram most recent 6/2023 with EF 61 to 65% with concentric LVH, no significant valvular disease, followed by Dr. Amber Mayfield most recently earlier this week, EKG reported unremarkable at that time, currently asymptomatic, patient to continue his carvedilol, Plavix, Jardiance, Lipitor, aspirin and lisinopril.

## 2024-07-26 NOTE — ASSESSMENT & PLAN NOTE
Status post non-STEMI 2016 status post VIVIAN to the RCA, repeat MI 2020 with VIVIAN to the OM, echo cardiogram most recent 6/2023 with EF 61 to 65% with concentric LVH, no significant valvular disease, previously followed by Dr. Watters, currently followed by Dr. Amber Mayfield of cardiology.  Recent EKG earlier this week normal..  Continue current regimen.

## 2024-07-26 NOTE — PROGRESS NOTES
Male Physical Note      Date: 2024   Patient Name: Wisam Sarmiento  : 1970   MRN: [id number]     Chief Complaint:    Chief Complaint   Patient presents with    Annual Exam       History of Present Illness: Wisam Sarmiento is a 54 y.o. male who is here today for their annual health maintenance and physical.  Patient feels well today with no acute complaints.  He has a history of premature coronary artery disease status post NSTEMI in  with VIVIAN to the RCA, subsequent STEMI in  with VIVIAN to the OM, currently asymptomatic at this time feeling well, specifically denying chest pains palpitations dyspnea dizziness or edema.  Takes carvedilol Plavix Jardiance Lipitor aspirin, recently having run out of lisinopril but restarting in the last couple days.  Most recent echocardiogram reveals EF of 61 to 65% with concentric LVH in .  Saw cardiology earlier this week having had an EKG reported normal with no change in medication regimen.  Has hyperlipidemia taking Lipitor, hypertension having run out of lisinopril 40 mg daily for couple weeks but given increasing blood pressures in the 140s over 80s, previously having been 120/80 range on the lisinopril, just restarted the lisinopril back.  Hypothyroid on levothyroxine, daytime hypersomnia having previously declined recommended HST, GERD well treated on omeprazole, previous ED declining current need for Viagra, ex-cigarette smoker of 2 packs/day x 20 years quitting in , subsequently dipping to 3 cans of smokeless tobacco daily.  Also has a right scrotal mass which is asymptomatic, patient relating having been present for years and unchanged.  Health maintenance includes patient having been referred for colon cancer screening with [facility] in , having not obtain the study as of last year and still again not having yet obtained the screen.  Also is past due for low-dose screening chest CT having failed to follow-up with referral last year.  Due for  recommend update vaccines including Prevnar 20 Shingrix COVID-19 and hepatitis B, also update labs, EKG performed at cardiologist recently reported normal.      Subjective      Review of Systems:   Review of Systems    Past Medical History, Social History, Family History and Care Team were all reviewed with patient and updated as appropriate.     Medications:     Current Outpatient Medications:     aspirin 81 MG EC tablet, Take 1 tablet by mouth Daily., Disp: , Rfl:     atorvastatin (LIPITOR) 80 MG tablet, Take 1 tablet by mouth Every Evening., Disp: 90 tablet, Rfl: 1    carvedilol (COREG) 25 MG tablet, Take 1 tablet by mouth 2 (Two) Times a Day With Meals., Disp: 180 tablet, Rfl: 1    clopidogrel (PLAVIX) 75 MG tablet, Take 1 tablet by mouth Daily., Disp: 90 tablet, Rfl: 1    HumaLOG KwikPen 100 UNIT/ML solution pen-injector, Inject 5-7 Units under the skin into the appropriate area as directed 3 (Three) Times a Day Before Meals., Disp: 10 mL, Rfl: 5    Insulin Pen Needle (ReliOn Pen Needles) 31G X 6 MM misc, Check twice daily E11.9, Disp: 120 each, Rfl: 6    Lantus SoloStar 100 UNIT/ML injection pen, Inject 50 Units under the skin into the appropriate area as directed Every Night. Inject 50-80 units nightly at bedtime. Starting at 53 units, then increase by 3 units every 3-4 days until morning fasting glucose ., Disp: 72 mL, Rfl: 6    levothyroxine (SYNTHROID, LEVOTHROID) 50 MCG tablet, Take 1 tablet by mouth once daily, Disp: 90 tablet, Rfl: 0    lisinopril (PRINIVIL,ZESTRIL) 40 MG tablet, Take 1 tablet by mouth Daily., Disp: 90 tablet, Rfl: 1    nitroglycerin (NITROSTAT) 0.4 MG SL tablet, Place 1 tablet under the tongue Every 5 (Five) Minutes As Needed for Chest Pain. Take no more than 3 doses in 15 minutes., Disp: 25 tablet, Rfl: 11    omeprazole (priLOSEC) 20 MG capsule, Take 1 capsule by mouth once daily, Disp: 30 capsule, Rfl: 3    Continuous Glucose  (Dexcom G7 ) device, Use 1 kit 3  (Three) Times a Day. Use to check blood sugar levels three times daily. E11.21, Z79.4, Disp: 1 each, Rfl: 0    Continuous Glucose Sensor (Dexcom G7 Sensor) misc, Use 3 kits 3 (Three) Times a Day. Use to check blood sugar levels three times daily. E11.21, Z79.4, Disp: 6 each, Rfl: 2    empagliflozin (Jardiance) 10 MG tablet tablet, Take 1 tablet by mouth once daily, Disp: 30 tablet, Rfl: 3    Allergies:   Allergies   Allergen Reactions    Penicillins Unknown - Low Severity       Immunizations:  Health Maintenance Summary            Ordered - COLORECTAL CANCER SCREENING (View Topic Details) Ordered on 7/26/2024 06/09/2023  Postponed until 6/9/2023 by Kim Armijo MA (Pending event)              Overdue - Hepatitis B (1 of 3 - 19+ 3-dose series) Never done      06/09/2023  Postponed until 6/9/2024 by Kim Armijo MA (Patient Refused)              Overdue - ZOSTER VACCINE (1 of 2) Never done      06/09/2023  Postponed until 6/9/2023 by Kim Armijo MA (Patient Refused)              Overdue - DIABETIC EYE EXAM (Yearly) Overdue since 2/1/2023 06/09/2023  Postponed until 7/28/2023 by Kim Armijo MA (Pending event)    02/01/2022  Patient-Reported (Performed Externally) - early diabetic changes              Ordered - LIPID PANEL (Yearly) Ordered on 7/26/2024 09/12/2022  Lipid Panel    06/11/2021  Done    06/09/2020  Lipid Panel              Ordered - URINE MICROALBUMIN (Yearly) Ordered on 7/26/2024 09/12/2022  Microalbumin, Urine component of MicroAlbumin, Urine, Random - Urine, Clean Catch              Ordered - LUNG CANCER SCREENING (Yearly) Ordered on 7/26/2024 09/21/2022   CT Chest Low Dose Cancer Screening WO              Overdue - DIABETIC FOOT EXAM (Yearly) Overdue since 6/9/2024 06/09/2023  SmartData: WORKFLOW - DIABETES - DIABETIC FOOT EXAM PERFORMED    06/09/2023  SmartData: FINDINGS - TESTS - NEUROLOGY - MONOFILAMENT TEST - MONOFILAMENT TEST PERFORMED    06/09/2023   SmartData: WORKFLOW - DIABETES - DIABETIC FOOT EXAM PERFORMED    09/12/2022  Done              Overdue - BMI FOLLOWUP (Yearly) Overdue since 6/9/2024 06/09/2023  SmartData: WORKFLOW - QUALITY MEASUREMENT - DOCUMENTED WEIGHT FOLLOW-UP PLAN    09/12/2022  SmartData: WORKFLOW - QUALITY MEASUREMENT - DOCUMENTED WEIGHT FOLLOW-UP PLAN              Postponed - COVID-19 Vaccine (3 - 2023-24 season) Postponed until 12/31/2024 07/26/2024  Postponed until 12/31/2024 by Sofia Hughes (Patient Refused - refused)    06/09/2023  Postponed until 6/11/2023 by Kim Armijo MA (Patient Refused)    04/20/2021  Imm Admin: COVID-19 (PFIZER) Purple Cap Monovalent    03/24/2021  Imm Admin: COVID-19 (PFIZER) Purple Cap Monovalent              INFLUENZA VACCINE (Yearly - August to March) Next due on 8/1/2024 09/12/2022  Imm Admin: Fluzone Quad >6mos (Multi-dose)    09/14/2021  Imm Admin: Influenza, Unspecified    09/14/2020  Imm Admin: Influenza TIV (IM)    09/12/2019  Imm Admin: Influenza TIV (IM)    09/13/2018  Imm Admin: Influenza TIV (IM)    Only the first 5 history entries have been loaded, but more history exists.              HEMOGLOBIN A1C (Every 6 Months) Next due on 1/26/2025 07/26/2024  Hemoglobin A1C component of POC Glycosylated Hemoglobin (Hb A1C)    06/09/2023  Hemoglobin A1C component of POC Glycosylated Hemoglobin (Hb A1C)    09/12/2022  Hemoglobin A1C component of Hemoglobin A1c    12/14/2021  Done    06/09/2020  Hemoglobin A1C component of Hemoglobin A1c    Only the first 5 history entries have been loaded, but more history exists.              ANNUAL PHYSICAL (Yearly) Next due on 7/26/2025 07/26/2024  Done    09/12/2022  Registry Metric: Last Annual Physical    06/14/2021  Done              TDAP/TD VACCINES (2 - Td or Tdap) Next due on 12/16/2026 12/16/2016  Imm Admin: Tdap              HEPATITIS C SCREENING  Completed      09/12/2022  Hep C Virus Ab component of Hepatitis C Antibody  "             Pneumococcal Vaccine 0-64 (Series Information) Completed      07/26/2024  Imm Admin: Pneumococcal Conjugate 20-Valent (PCV20)    06/09/2023  Postponed until 6/9/2023 by Kim Armijo MA (Pending event)    03/13/2018  Imm Admin: Pneumococcal Polysaccharide (PPSV23)    03/13/2018  Imm Admin: Pneumococcal Polysaccharide (PPSV23)                    Orders Placed This Encounter   Procedures    Pneumococcal Conjugate Vaccine 20-Valent All       Colorectal Screening:   Previously referred for Cologuard not pursued by patient, rereferral for Cologuard today  Last Completed Colonoscopy       This patient has no relevant Health Maintenance data.          CT for Smoker (Age 50-80, 20pk yr within last 15 years): Rereferral for low-dose CT scan of the chest today  Bone Density/DEXA (high risk): N/A  Hep C (Age 18-79 once): Previously negative  HIV (Age 15-65 once) : N/A  PSA (Over age 50, C Level Recommendation): Pending  US Aorta (For male smokers, age 65): N/A  A1c:   Hemoglobin A1C   Date Value Ref Range Status   07/26/2024 8.9 (A) 4.5 - 5.7 % Final   06/09/2020 8.20 (H) 4.80 - 5.60 % Final     Lipid panel: No results found for: \"LABLIPI\"    The ASCVD Risk score ([place] DK, et al., 2019) failed to calculate for the following reasons:    The patient has a prior MI or stroke diagnosis    Dermatology: N/A  Ophthalmologist: Yearly screening recommendation  Dentist: Recommended follow-up    Tobacco Use: High Risk (7/26/2024)    Patient History     Smoking Tobacco Use: Former     Smokeless Tobacco Use: Current     Passive Exposure: Past       Social History     Substance and Sexual Activity   Alcohol Use Never    Comment: no alcohol x4+ years, previous light social drinker        Social History     Substance and Sexual Activity   Drug Use Never        Diet/Physical activity: Improving diet, improving physical activity    Sexual health: Denies concern, contraception not required    PHQ-2 Depression Screening  PHQ-9 " "Total Score: 0         Objective     Physical Exam:  Vital Signs:   Vitals:    07/26/24 0856   BP: 126/82   BP Location: Left arm   Patient Position: Sitting   Cuff Size: Adult   Pulse: 72   Temp: 97.8 °F (36.6 °C)   TempSrc: Temporal   SpO2: 96%   Weight: 85.5 kg (188 lb 6.4 oz)   Height: 172.7 cm (68\")     Facility age limit for growth %brandon is 20 years.  Body mass index is 28.65 kg/m².     Physical Exam  Vitals and nursing note reviewed.   Constitutional:       General: He is not in acute distress.     Appearance: Normal appearance. He is not ill-appearing.      Comments: Pleasant, healthy, no acute distress, alert and oriented, fluent speech, BMI 28.6   HENT:      Head: Normocephalic and atraumatic.      Right Ear: Tympanic membrane, ear canal and external ear normal.      Left Ear: Tympanic membrane, ear canal and external ear normal.      Nose: Nose normal.      Mouth/Throat:      Mouth: Mucous membranes are moist.      Pharynx: Oropharynx is clear.      Comments: Upper denture plate, residual anterior mandibular dentition fair condition  Eyes:      Extraocular Movements: Extraocular movements intact.      Conjunctiva/sclera: Conjunctivae normal.      Pupils: Pupils are equal, round, and reactive to light.   Neck:      Vascular: No carotid bruit.      Comments: No cervical adenopathy masses or tenderness, no axillary periclavicular or inguinal adenopathy  Cardiovascular:      Rate and Rhythm: Normal rate and regular rhythm.      Pulses: Normal pulses.      Heart sounds: Normal heart sounds. No murmur heard.     No friction rub. No gallop.      Comments: 2+ peripheral pulses, no carotid or femoral bruits auscultated  Pulmonary:      Effort: Pulmonary effort is normal. No respiratory distress.      Breath sounds: Normal breath sounds.   Abdominal:      General: Abdomen is flat. Bowel sounds are normal. There is no distension.      Palpations: Abdomen is soft. There is no mass.      Tenderness: There is no " abdominal tenderness. There is no guarding or rebound.      Hernia: No hernia is present.   Genitourinary:     Comments: Normal uncircumcised male with testes 10 bilaterally, the right testicle associate with a firm nontender mass measuring multiple centimeters in diameter, not obviously transilluminating to light, patient indicating stable for many years, left testicle unremarkable with no obvious inguinal herniation or adenopathy, rectal exam with normal sphincter tone, no rectal masses, prostate palpated and unremarkable with no nodules or tenderness, minimal enlargement unremarkable for age  Musculoskeletal:         General: No swelling, tenderness or deformity. Normal range of motion.      Cervical back: Normal range of motion and neck supple. No rigidity or tenderness.      Right lower leg: No edema.      Left lower leg: No edema.   Lymphadenopathy:      Cervical: No cervical adenopathy.   Skin:     General: Skin is warm and dry.      Capillary Refill: Capillary refill takes less than 2 seconds.      Findings: No lesion or rash.   Neurological:      General: No focal deficit present.      Mental Status: He is alert and oriented to person, place, and time. Mental status is at baseline.   Psychiatric:         Mood and Affect: Mood normal.         Behavior: Behavior normal.         Thought Content: Thought content normal.         Judgment: Judgment normal.     Diabetic Foot Exam Performed and Monofilament Test Performed      POCT Results (if applicable):   Results for orders placed or performed in visit on 07/26/24   POC Glycosylated Hemoglobin (Hb A1C)    Specimen: Blood   Result Value Ref Range    Hemoglobin A1C 8.9 (A) 4.5 - 5.7 %    Lot Number [id number]     Expiration Date 04/26/2026    POC Glucose, Blood    Specimen: Blood   Result Value Ref Range    Glucose 163 (A) 70 - 130 mg/dL    Lot Number [id number]     Expiration Date 01/11/2025        Procedures    Assessment / Plan      Assessment/Plan:   Diagnoses  and all orders for this visit:    1. Encounter for general adult medical examination with abnormal findings (Primary)  Assessment & Plan:  54-year-old male presenting for complete physical, subjectively clinically stable, health issues being addressed as detailed below, health maintenance includes rereferral for colon cancer screening with Cologuard, rereferral for low-dose screening chest CT with patient abstaining from cigarette smoking since 2015, Prevnar 20 ministered today with recommendation to update Shingrix and COVID-19 vaccine outside the office and keep current with influenza action recommendations each fall, EKG performed at cardiology office earlier this week and reported normal, update recommended screening labs.    Orders:  -     US Scrotum & Testicles; Future  -     CT Chest Low Dose Wo; Future  -     POC Glycosylated Hemoglobin (Hb A1C)  -     POC Glucose, Blood  -     Comprehensive Metabolic Panel; Future  -     Lipid Panel; Future  -     PSA Screen; Future  -     Urinalysis With Culture If Indicated -; Future  -     MicroAlbumin, Urine, Random - Urine, Clean Catch; Future  -     Vitamin D,25-Hydroxy; Future  -     TSH; Future  -     T4, Free; Future  -     Cologuard - Stool, Per Rectum; Future  -     T4, Free  -     TSH  -     Vitamin D,25-Hydroxy  -     MicroAlbumin, Urine, Random - Urine, Clean Catch  -     Urinalysis With Culture If Indicated - Urine, Clean Catch  -     PSA Screen  -     Lipid Panel  -     Comprehensive Metabolic Panel    2. Coronary artery disease involving native coronary artery of native heart without angina pectoris  Assessment & Plan:  Status post non-STEMI 2016 status post VIVIAN to the RCA, repeat MI 2020 with VIVIAN to the OM, echo cardiogram most recent 6/2023 with EF 61 to 65% with concentric LVH, no significant valvular disease, followed by Dr. Amber Mayfield most recently earlier this week, EKG reported unremarkable at that time, currently asymptomatic, patient to continue  his carvedilol, Plavix, Jardiance, Lipitor, aspirin and lisinopril.      3. Status post non-ST elevation myocardial infarction (NSTEMI)  Assessment & Plan:  Status post non-STEMI 2016 status post VIVIAN to the RCA, repeat MI 2020 with VIVIAN to the OM, echo cardiogram most recent 6/2023 with EF 61 to 65% with concentric LVH, no significant valvular disease, previously followed by Dr. Watters, currently followed by Dr. Amber Mayfield of cardiology.  Recent EKG earlier this week normal..  Continue current regimen.      4. H/O heart artery stent  Assessment & Plan:  VIVIAN to the RCA in 2016, VIVIAN to the OM in 2020, continue risk factor modification including aspirin, Plavix, and statin.      5. Inadequately controlled diabetes mellitus  Assessment & Plan:  Hemoglobin A1c 8.9% today versus 7.8% in 6/2023, taking Jardiance 10 mg daily for cardiac and renal protection, currently on a regimen of Lantus 50 to 60 units nightly based upon how patient subjectively feels rather than monitoring, and also on Humalog 5 to 6 units before every meal.  Patient has not been checking blood sugars on any consistent basis.  This has been a problem in the past.  I advised him of the importance of doing so.  For now we will increase Lantus up to 70 units nightly, discussing titration protocol by increasing Lantus dose by 3 to 4 units every 3 to 4 days until fasting a.m. blood glucose is less than 120, after which Humalog dosing will be adjusted during the daytime, continue current dose of Humalog for now.  Understands needs to check blood glucose several times daily.  Will attempt to obtain a continuous glucose monitor for ease of monitoring.           Orders:  -     POC Glycosylated Hemoglobin (Hb A1C)  -     POC Glucose, Blood  -     MicroAlbumin, Urine, Random - Urine, Clean Catch; Future  -     MicroAlbumin, Urine, Random - Urine, Clean Catch    6. Insulin dependent type 2 diabetes mellitus  Assessment & Plan:  Hemoglobin A1c 8.9% today versus  7.8% in 6/2023, taking Jardiance 10 mg daily for cardiac and renal protection, currently on a regimen of Lantus 50 to 60 units nightly based upon how patient subjectively feels rather than monitoring, and also on Humalog 5 to 6 units before every meal.  Patient has not been checking blood sugars on any consistent basis.  This has been a problem in the past.  I advised him of the importance of doing so.  For now we will increase Lantus up to 70 units nightly, discussing titration protocol by increasing Lantus dose by 3 to 4 units every 3 to 4 days until fasting a.m. blood glucose is less than 120, after which Humalog dosing will be adjusted during the daytime, continue current dose of Humalog for now.  Understands needs to check blood glucose several times daily.  Will attempt to obtain a continuous glucose monitor for ease of monitoring.       Orders:  -     POC Glycosylated Hemoglobin (Hb A1C)  -     POC Glucose, Blood  -     MicroAlbumin, Urine, Random - Urine, Clean Catch; Future  -     MicroAlbumin, Urine, Random - Urine, Clean Catch    7. Primary hypertension  Assessment & Plan:  Niccoli controlled taking carvedilol 25 mg twice daily, with recent reinitiation of his lisinopril 40 mg daily.    Orders:  -     Comprehensive Metabolic Panel; Future  -     Urinalysis With Culture If Indicated -; Future  -     Urinalysis With Culture If Indicated - Urine, Clean Catch  -     Comprehensive Metabolic Panel    8. Mixed hyperlipidemia  Assessment & Plan:  Taking atorvastatin 80 mg nightly.  Update lipid profile.      Orders:  -     Lipid Panel; Future  -     Lipid Panel    9. Acquired hypothyroidism  Assessment & Plan:  Taking levothyroxine 50 mcg daily.  Update thyroid function testing.    Orders:  -     TSH; Future  -     T4, Free; Future  -     T4, Free  -     TSH    10. Overweight    11. Gastroesophageal reflux disease with esophagitis without hemorrhage  Assessment & Plan:  Well compensated on omeprazole 20 mg  daily.      12. Scrotal mass  Assessment & Plan:  Patient has had a chronic right scrotal mass, not obviously transilluminating, specific etiology uncertain though he maintains has been stable for many years.  Will obtain scrotal ultrasound and depending on clinical findings consider urology consultation on a nonurgent basis.    Orders:  -     US Scrotum & Testicles; Future    13. Cigarette nicotine dependence in remission  Assessment & Plan:  2 pack/day cigarette smoker x 20 years abstaining since 2015.  Encouraged ongoing abstinence.    Orders:  -     CT Chest Low Dose Wo; Future    14. Chewing tobacco nicotine dependence without complication  Assessment & Plan:  Ex-cigarette smoker staying since 2015 now dipping 2 to 3 cans of smokeless tobacco daily.  Advised patient of health risks of this habit and need to discontinue.        15. Screen for colon cancer  Assessment & Plan:  Patient initially given referral for Cologuard in 2022, having failed to obtain the study, reminded to do so in 6/2023, again continuing not to obtain the study as recommended and ordered.  Discussed again need for colon cancer screening, including colonoscopy versus Cologuard, patient electing for the latter, and will make another referral for Cologuard today, advising patient of the importance of colon cancer screening for health risk reduction.    Orders:  -     Cologuard - Stool, Per Rectum; Future    16. Screening for lung cancer  Assessment & Plan:  Patient given referral for low-dose screening chest CT last year which was not pursued by patient.  Will rerefer, advising patient of the importance of attaining this screen for health risk reduction.    Orders:  -     CT Chest Low Dose Wo; Future    17. Vitamin D deficiency  -     Vitamin D,25-Hydroxy; Future  -     Vitamin D,25-Hydroxy    18. Screening for prostate cancer  -     PSA Screen; Future  -     PSA Screen    19. Need for prophylactic vaccination against Streptococcus pneumoniae  (pneumococcus)  -     Pneumococcal Conjugate Vaccine 20-Valent All         Healthcare Maintenance:  Counseling provided based on age appropriate USPSTF guidelines.  BMI is >= 25 and <30. (Overweight) The following options were offered after discussion;: exercise counseling/recommendations and nutrition counseling/recommendations    Wisam Sarmiento voices understanding and acceptance of this advice and will call back with any further questions or concerns. AVS with preventive healthcare tips printed for patient.     Vaccine Counseling:  “Discussed risks/benefits to vaccination, reviewed components of the vaccine, discussed VIS, discussed informed consent, informed consent obtained. Patient/Parent was allowed to accept or refuse vaccine. Questions answered to satisfactory state of patient/Parent. We reviewed typical age appropriate and seasonally appropriate vaccinations. Reviewed immunization history and updated state vaccination form as needed. Patient was counseled on Prevnar 20    Follow Up:   Return in about 3 months (around 10/26/2024) for Recheck, Labs today.    At [facility], we believe that sharing information builds trust and better relationships. You are receiving this note because you recently visited [facility]. It is possible you will see health information before a provider has talked with you about it. This kind of information can be easy to misunderstand. To help you fully understand what it means for your health, we urge you to discuss this note with your provider.    Nicho Dinh MD  [facility]

## 2024-07-27 LAB
25(OH)D3+25(OH)D2 SERPL-MCNC: 26.8 NG/ML (ref 30–100)
ALBUMIN SERPL-MCNC: 4.7 G/DL (ref 3.8–4.9)
ALP SERPL-CCNC: 138 IU/L (ref 44–121)
ALT SERPL-CCNC: 29 IU/L (ref 0–44)
APPEARANCE UR: CLEAR
AST SERPL-CCNC: 29 IU/L (ref 0–40)
BACTERIA #/AREA URNS HPF: NORMAL /[HPF]
BILIRUB SERPL-MCNC: 0.5 MG/DL (ref 0–1.2)
BILIRUB UR QL STRIP: NEGATIVE
BUN SERPL-MCNC: 27 MG/DL (ref 6–24)
BUN/CREAT SERPL: 25 (ref 9–20)
CALCIUM SERPL-MCNC: 10.3 MG/DL (ref 8.7–10.2)
CASTS URNS QL MICRO: NORMAL /LPF
CHLORIDE SERPL-SCNC: 106 MMOL/L (ref 96–106)
CHOLEST SERPL-MCNC: 137 MG/DL (ref 100–199)
CO2 SERPL-SCNC: 22 MMOL/L (ref 20–29)
COLOR UR: YELLOW
CREAT SERPL-MCNC: 1.1 MG/DL (ref 0.76–1.27)
EGFRCR SERPLBLD CKD-EPI 2021: 81 ML/MIN/1.73
EPI CELLS #/AREA URNS HPF: NORMAL /HPF (ref 0–10)
GLOBULIN SER CALC-MCNC: 2.9 G/DL (ref 1.5–4.5)
GLUCOSE SERPL-MCNC: 74 MG/DL (ref 70–99)
GLUCOSE UR QL STRIP: ABNORMAL
HDLC SERPL-MCNC: 25 MG/DL
HGB UR QL STRIP: NEGATIVE
KETONES UR QL STRIP: NEGATIVE
LDLC SERPL CALC-MCNC: 86 MG/DL (ref 0–99)
LEUKOCYTE ESTERASE UR QL STRIP: NEGATIVE
MICRO URNS: ABNORMAL
MICRO URNS: ABNORMAL
MICROALBUMIN UR-MCNC: <3 UG/ML
NITRITE UR QL STRIP: NEGATIVE
PH UR STRIP: 5.5 [PH] (ref 5–7.5)
POTASSIUM SERPL-SCNC: 4.6 MMOL/L (ref 3.5–5.2)
PROT SERPL-MCNC: 7.6 G/DL (ref 6–8.5)
PROT UR QL STRIP: NEGATIVE
PSA SERPL-MCNC: 1.5 NG/ML (ref 0–4)
RBC #/AREA URNS HPF: NORMAL /HPF (ref 0–2)
SODIUM SERPL-SCNC: 144 MMOL/L (ref 134–144)
SP GR UR STRIP: >=1.03 (ref 1–1.03)
T4 FREE SERPL-MCNC: 1.17 NG/DL (ref 0.82–1.77)
TRIGL SERPL-MCNC: 146 MG/DL (ref 0–149)
TSH SERPL DL<=0.005 MIU/L-ACNC: 3.25 UIU/ML (ref 0.45–4.5)
URINALYSIS REFLEX: ABNORMAL
UROBILINOGEN UR STRIP-MCNC: 0.2 MG/DL (ref 0.2–1)
VLDLC SERPL CALC-MCNC: 26 MG/DL (ref 5–40)
WBC #/AREA URNS HPF: NORMAL /HPF (ref 0–5)

## 2024-07-27 RX ORDER — CHOLECALCIFEROL (VITAMIN D3) 25 MCG
1000 TABLET ORAL DAILY
COMMUNITY

## 2024-07-27 NOTE — ASSESSMENT & PLAN NOTE
Patient has had a chronic right scrotal mass, not obviously transilluminating, specific etiology uncertain though he maintains has been stable for many years.  Will obtain scrotal ultrasound and depending on clinical findings consider urology consultation on a nonurgent basis.

## 2024-07-27 NOTE — ASSESSMENT & PLAN NOTE
Patient given referral for low-dose screening chest CT last year which was not pursued by patient.  Will rerefer, advising patient of the importance of attaining this screen for health risk reduction.

## 2024-07-27 NOTE — ASSESSMENT & PLAN NOTE
Hemoglobin A1c 8.9% today versus 7.8% in 6/2023, taking Jardiance 10 mg daily for cardiac and renal protection, currently on a regimen of Lantus 50 to 60 units nightly based upon how patient subjectively feels rather than monitoring, and also on Humalog 5 to 6 units before every meal.  Patient has not been checking blood sugars on any consistent basis.  This has been a problem in the past.  I advised him of the importance of doing so.  For now we will increase Lantus up to 70 units nightly, discussing titration protocol by increasing Lantus dose by 3 to 4 units every 3 to 4 days until fasting a.m. blood glucose is less than 120, after which Humalog dosing will be adjusted during the daytime, continue current dose of Humalog for now.  Understands needs to check blood glucose several times daily.  Will attempt to obtain a continuous glucose monitor for ease of monitoring.

## 2024-07-27 NOTE — ASSESSMENT & PLAN NOTE
Patient initially given referral for Cologuard in 2022, having failed to obtain the study, reminded to do so in 6/2023, again continuing not to obtain the study as recommended and ordered.  Discussed again need for colon cancer screening, including colonoscopy versus Cologuard, patient electing for the latter, and will make another referral for Cologuard today, advising patient of the importance of colon cancer screening for health risk reduction.

## 2024-07-27 NOTE — ASSESSMENT & PLAN NOTE
Ex-cigarette smoker staying since 2015 now dipping 2 to 3 cans of smokeless tobacco daily.  Advised patient of health risks of this habit and need to discontinue.

## 2024-07-27 NOTE — ASSESSMENT & PLAN NOTE
54-year-old male presenting for complete physical, subjectively clinically stable, health issues being addressed as detailed below, health maintenance includes rereferral for colon cancer screening with Cologuard, rereferral for low-dose screening chest CT with patient abstaining from cigarette smoking since 2015, Prevnar 20 ministered today with recommendation to update Shingrix and COVID-19 vaccine outside the office and keep current with influenza action recommendations each fall, EKG performed at cardiology office earlier this week and reported normal, update recommended screening labs.

## 2024-07-29 ENCOUNTER — TELEPHONE (OUTPATIENT)
Dept: FAMILY MEDICINE CLINIC | Facility: CLINIC | Age: 54
End: 2024-07-29
Payer: COMMERCIAL

## 2024-07-29 NOTE — TELEPHONE ENCOUNTER
----- Message from Nicho Dinh sent at 7/27/2024  3:32 PM EDT -----  Please advise patient that his recent laboratory testing indicated a mild vitamin D deficiency for which he is recommended to start taking vitamin D 1000 IU once daily over-the-counter, remainder of lab testing was satisfactory including his cholesterol profile, chemistry profile, urinalysis which revealed elevated sugar consistent with his suboptimal control of diabetes otherwise normal, thyroid function testing, and PSA or prostate cancer screen.  Only recommended changes this time is adding the vitamin D as noted.    I have spoke with him regarding his lab results. TF  
Statement Selected

## 2024-08-06 DIAGNOSIS — E11.9 INSULIN DEPENDENT TYPE 2 DIABETES MELLITUS: ICD-10-CM

## 2024-08-06 DIAGNOSIS — Z79.4 INSULIN DEPENDENT TYPE 2 DIABETES MELLITUS: ICD-10-CM

## 2024-08-06 RX ORDER — INSULIN LISPRO 100 [IU]/ML
INJECTION, SOLUTION INTRAVENOUS; SUBCUTANEOUS
Qty: 6 ML | Refills: 6 | Status: SHIPPED | OUTPATIENT
Start: 2024-08-06

## 2024-09-22 DIAGNOSIS — E03.9 ACQUIRED HYPOTHYROIDISM: ICD-10-CM

## 2024-09-23 ENCOUNTER — TELEPHONE (OUTPATIENT)
Dept: FAMILY MEDICINE CLINIC | Facility: CLINIC | Age: 54
End: 2024-09-23
Payer: COMMERCIAL

## 2024-09-23 RX ORDER — LEVOTHYROXINE SODIUM 50 UG/1
50 TABLET ORAL DAILY
Qty: 90 TABLET | Refills: 2 | Status: SHIPPED | OUTPATIENT
Start: 2024-09-23

## 2024-10-23 RX ORDER — INSULIN GLARGINE 100 [IU]/ML
50 INJECTION, SOLUTION SUBCUTANEOUS NIGHTLY
Qty: 72 ML | Refills: 6 | Status: SHIPPED | OUTPATIENT
Start: 2024-10-23

## 2024-11-11 DIAGNOSIS — K21.00 GASTROESOPHAGEAL REFLUX DISEASE WITH ESOPHAGITIS WITHOUT HEMORRHAGE: ICD-10-CM

## 2024-12-10 DIAGNOSIS — K21.00 GASTROESOPHAGEAL REFLUX DISEASE WITH ESOPHAGITIS WITHOUT HEMORRHAGE: ICD-10-CM

## 2024-12-21 DIAGNOSIS — I25.10 CORONARY ARTERY DISEASE INVOLVING NATIVE CORONARY ARTERY OF NATIVE HEART WITHOUT ANGINA PECTORIS: ICD-10-CM

## 2024-12-21 DIAGNOSIS — E11.9 INSULIN DEPENDENT TYPE 2 DIABETES MELLITUS: ICD-10-CM

## 2024-12-21 DIAGNOSIS — I25.2 STATUS POST NON-ST ELEVATION MYOCARDIAL INFARCTION (NSTEMI): ICD-10-CM

## 2024-12-21 DIAGNOSIS — Z79.4 INSULIN DEPENDENT TYPE 2 DIABETES MELLITUS: ICD-10-CM

## 2024-12-23 RX ORDER — EMPAGLIFLOZIN 10 MG/1
TABLET, FILM COATED ORAL
Qty: 30 TABLET | Refills: 0 | OUTPATIENT
Start: 2024-12-23

## 2024-12-23 NOTE — TELEPHONE ENCOUNTER
Rx denied patient needed to follow up in 3 months     Please schedule appointment if patient calls back

## 2025-01-09 DIAGNOSIS — K21.00 GASTROESOPHAGEAL REFLUX DISEASE WITH ESOPHAGITIS WITHOUT HEMORRHAGE: ICD-10-CM

## 2025-01-13 RX ORDER — ACYCLOVIR 400 MG/1
1 TABLET ORAL 3 TIMES DAILY
Qty: 6 EACH | Refills: 2 | Status: SHIPPED | OUTPATIENT
Start: 2025-01-13

## 2025-02-05 DIAGNOSIS — I25.10 CORONARY ARTERY DISEASE INVOLVING NATIVE CORONARY ARTERY OF NATIVE HEART WITHOUT ANGINA PECTORIS: ICD-10-CM

## 2025-02-05 DIAGNOSIS — I10 PRIMARY HYPERTENSION: ICD-10-CM

## 2025-02-06 RX ORDER — CARVEDILOL 25 MG/1
25 TABLET ORAL 2 TIMES DAILY WITH MEALS
Qty: 180 TABLET | Refills: 0 | Status: SHIPPED | OUTPATIENT
Start: 2025-02-06

## 2025-02-19 DIAGNOSIS — E78.2 MIXED HYPERLIPIDEMIA: ICD-10-CM

## 2025-02-19 RX ORDER — ATORVASTATIN CALCIUM 80 MG/1
80 TABLET, FILM COATED ORAL EVERY EVENING
Qty: 90 TABLET | Refills: 0 | Status: SHIPPED | OUTPATIENT
Start: 2025-02-19

## 2025-03-14 DIAGNOSIS — I25.10 CORONARY ARTERY DISEASE INVOLVING NATIVE CORONARY ARTERY OF NATIVE HEART WITHOUT ANGINA PECTORIS: ICD-10-CM

## 2025-03-14 RX ORDER — CLOPIDOGREL BISULFATE 75 MG/1
75 TABLET ORAL DAILY
Qty: 90 TABLET | Refills: 0 | Status: SHIPPED | OUTPATIENT
Start: 2025-03-14

## 2025-03-14 NOTE — TELEPHONE ENCOUNTER
ATTEMPTED TO CALL PATIENT TO SEE IF HE IS CURRENTLY TAKING OMEPRAZOLE, NO ANSWER, UNABLE TO LEAVE VOICEMAIL

## 2025-05-04 DIAGNOSIS — E11.9 INSULIN DEPENDENT TYPE 2 DIABETES MELLITUS: ICD-10-CM

## 2025-05-04 DIAGNOSIS — K21.00 GASTROESOPHAGEAL REFLUX DISEASE WITH ESOPHAGITIS WITHOUT HEMORRHAGE: ICD-10-CM

## 2025-05-04 DIAGNOSIS — Z79.4 INSULIN DEPENDENT TYPE 2 DIABETES MELLITUS: ICD-10-CM

## 2025-05-05 RX ORDER — OMEPRAZOLE 20 MG/1
20 CAPSULE, DELAYED RELEASE ORAL DAILY
Qty: 30 CAPSULE | Refills: 0 | Status: SHIPPED | OUTPATIENT
Start: 2025-05-05

## 2025-05-05 RX ORDER — INSULIN LISPRO 100 [IU]/ML
INJECTION, SOLUTION INTRAVENOUS; SUBCUTANEOUS
Qty: 6 ML | Refills: 0 | Status: SHIPPED | OUTPATIENT
Start: 2025-05-05

## 2025-05-21 DIAGNOSIS — E78.2 MIXED HYPERLIPIDEMIA: ICD-10-CM

## 2025-05-21 DIAGNOSIS — I25.10 CORONARY ARTERY DISEASE INVOLVING NATIVE CORONARY ARTERY OF NATIVE HEART WITHOUT ANGINA PECTORIS: ICD-10-CM

## 2025-05-21 DIAGNOSIS — I10 PRIMARY HYPERTENSION: ICD-10-CM

## 2025-05-21 RX ORDER — ACYCLOVIR 400 MG/1
1 TABLET ORAL 3 TIMES DAILY
Qty: 6 EACH | Refills: 2 | Status: SHIPPED | OUTPATIENT
Start: 2025-05-21

## 2025-05-22 RX ORDER — CARVEDILOL 25 MG/1
25 TABLET ORAL 2 TIMES DAILY WITH MEALS
Qty: 60 TABLET | Refills: 0 | Status: SHIPPED | OUTPATIENT
Start: 2025-05-22

## 2025-05-22 RX ORDER — ATORVASTATIN CALCIUM 80 MG/1
80 TABLET, FILM COATED ORAL EVERY EVENING
Qty: 30 TABLET | Refills: 0 | Status: SHIPPED | OUTPATIENT
Start: 2025-05-22

## 2025-05-30 DIAGNOSIS — Z79.4 INSULIN DEPENDENT TYPE 2 DIABETES MELLITUS: ICD-10-CM

## 2025-05-30 DIAGNOSIS — E11.9 INSULIN DEPENDENT TYPE 2 DIABETES MELLITUS: ICD-10-CM

## 2025-05-30 RX ORDER — INSULIN LISPRO 100 [IU]/ML
INJECTION, SOLUTION INTRAVENOUS; SUBCUTANEOUS
Qty: 6 ML | Refills: 3 | Status: SHIPPED | OUTPATIENT
Start: 2025-05-30

## 2025-05-30 NOTE — TELEPHONE ENCOUNTER
Last seen 7/26/2024. I have left him a vm letting him know that he is due for an appt to see Dr. Torre. I have asked that he call and set this up when he can. TF

## 2025-06-02 DIAGNOSIS — K21.00 GASTROESOPHAGEAL REFLUX DISEASE WITH ESOPHAGITIS WITHOUT HEMORRHAGE: ICD-10-CM

## 2025-06-02 RX ORDER — OMEPRAZOLE 20 MG/1
20 CAPSULE, DELAYED RELEASE ORAL DAILY
Qty: 30 CAPSULE | Refills: 0 | Status: SHIPPED | OUTPATIENT
Start: 2025-06-02

## 2025-06-08 DIAGNOSIS — E03.9 ACQUIRED HYPOTHYROIDISM: ICD-10-CM

## 2025-06-09 RX ORDER — LEVOTHYROXINE SODIUM 50 UG/1
50 TABLET ORAL DAILY
Qty: 90 TABLET | Refills: 1 | Status: SHIPPED | OUTPATIENT
Start: 2025-06-09

## 2025-06-18 DIAGNOSIS — I25.10 CORONARY ARTERY DISEASE INVOLVING NATIVE CORONARY ARTERY OF NATIVE HEART WITHOUT ANGINA PECTORIS: ICD-10-CM

## 2025-06-18 DIAGNOSIS — I10 PRIMARY HYPERTENSION: ICD-10-CM

## 2025-06-20 RX ORDER — CARVEDILOL 25 MG/1
25 TABLET ORAL 2 TIMES DAILY WITH MEALS
Qty: 60 TABLET | Refills: 0 | Status: SHIPPED | OUTPATIENT
Start: 2025-06-20

## 2025-06-28 DIAGNOSIS — K21.00 GASTROESOPHAGEAL REFLUX DISEASE WITH ESOPHAGITIS WITHOUT HEMORRHAGE: ICD-10-CM

## 2025-06-30 RX ORDER — OMEPRAZOLE 20 MG/1
20 CAPSULE, DELAYED RELEASE ORAL DAILY
Qty: 30 CAPSULE | Refills: 1 | Status: SHIPPED | OUTPATIENT
Start: 2025-06-30

## 2025-07-02 DIAGNOSIS — E78.2 MIXED HYPERLIPIDEMIA: ICD-10-CM

## 2025-07-02 DIAGNOSIS — I25.10 CORONARY ARTERY DISEASE INVOLVING NATIVE CORONARY ARTERY OF NATIVE HEART WITHOUT ANGINA PECTORIS: ICD-10-CM

## 2025-07-02 RX ORDER — ATORVASTATIN CALCIUM 80 MG/1
80 TABLET, FILM COATED ORAL EVERY EVENING
Qty: 30 TABLET | Refills: 0 | Status: SHIPPED | OUTPATIENT
Start: 2025-07-02

## 2025-07-02 RX ORDER — CLOPIDOGREL BISULFATE 75 MG/1
75 TABLET ORAL DAILY
Qty: 90 TABLET | Refills: 0 | OUTPATIENT
Start: 2025-07-02

## 2025-07-07 RX ORDER — LISINOPRIL 40 MG/1
40 TABLET ORAL DAILY
Qty: 90 TABLET | Refills: 3 | Status: SHIPPED | OUTPATIENT
Start: 2025-07-07

## 2025-07-08 DIAGNOSIS — I25.10 CORONARY ARTERY DISEASE INVOLVING NATIVE CORONARY ARTERY OF NATIVE HEART WITHOUT ANGINA PECTORIS: ICD-10-CM

## 2025-07-08 DIAGNOSIS — I10 PRIMARY HYPERTENSION: ICD-10-CM

## 2025-07-09 DIAGNOSIS — I77.9 MILD CAROTID ARTERY DISEASE: Primary | ICD-10-CM

## 2025-07-09 DIAGNOSIS — I25.10 CORONARY ARTERY DISEASE INVOLVING NATIVE CORONARY ARTERY OF NATIVE HEART WITHOUT ANGINA PECTORIS: ICD-10-CM

## 2025-07-09 RX ORDER — CLOPIDOGREL BISULFATE 75 MG/1
75 TABLET ORAL DAILY
Qty: 60 TABLET | Refills: 0 | Status: SHIPPED | OUTPATIENT
Start: 2025-07-09

## 2025-07-09 RX ORDER — CARVEDILOL 25 MG/1
25 TABLET ORAL 2 TIMES DAILY WITH MEALS
Qty: 60 TABLET | Refills: 0 | OUTPATIENT
Start: 2025-07-09

## 2025-07-09 RX ORDER — CLOPIDOGREL BISULFATE 75 MG/1
75 TABLET ORAL DAILY
Qty: 90 TABLET | Refills: 0 | OUTPATIENT
Start: 2025-07-09

## 2025-07-09 NOTE — TELEPHONE ENCOUNTER
Patient's wife called asking for a refill on Plavix. We had denied it due to him needing an appt. He has enough for 7 days but we could not get his appt and carotid scheduled until 8/22. Can we send in a refill to cover until his follow up?

## 2025-07-11 RX ORDER — ACYCLOVIR 400 MG/1
TABLET ORAL
Qty: 6 EACH | Refills: 0 | Status: SHIPPED | OUTPATIENT
Start: 2025-07-11

## 2025-07-14 DIAGNOSIS — I10 PRIMARY HYPERTENSION: ICD-10-CM

## 2025-07-14 DIAGNOSIS — I25.10 CORONARY ARTERY DISEASE INVOLVING NATIVE CORONARY ARTERY OF NATIVE HEART WITHOUT ANGINA PECTORIS: ICD-10-CM

## 2025-07-14 RX ORDER — CARVEDILOL 25 MG/1
25 TABLET ORAL 2 TIMES DAILY WITH MEALS
Qty: 60 TABLET | Refills: 0 | Status: SHIPPED | OUTPATIENT
Start: 2025-07-14

## 2025-07-27 DIAGNOSIS — E78.2 MIXED HYPERLIPIDEMIA: ICD-10-CM

## 2025-07-28 RX ORDER — ATORVASTATIN CALCIUM 80 MG/1
80 TABLET, FILM COATED ORAL EVERY EVENING
Qty: 30 TABLET | Refills: 0 | Status: SHIPPED | OUTPATIENT
Start: 2025-07-28

## 2025-08-14 DIAGNOSIS — I10 PRIMARY HYPERTENSION: ICD-10-CM

## 2025-08-14 DIAGNOSIS — I25.10 CORONARY ARTERY DISEASE INVOLVING NATIVE CORONARY ARTERY OF NATIVE HEART WITHOUT ANGINA PECTORIS: ICD-10-CM

## 2025-08-14 RX ORDER — CARVEDILOL 25 MG/1
25 TABLET ORAL 2 TIMES DAILY WITH MEALS
Qty: 60 TABLET | Refills: 1 | Status: SHIPPED | OUTPATIENT
Start: 2025-08-14

## 2025-08-22 ENCOUNTER — OFFICE VISIT (OUTPATIENT)
Dept: CARDIOLOGY | Facility: CLINIC | Age: 55
End: 2025-08-22
Payer: COMMERCIAL

## 2025-08-22 ENCOUNTER — OFFICE VISIT (OUTPATIENT)
Dept: FAMILY MEDICINE CLINIC | Facility: CLINIC | Age: 55
End: 2025-08-22
Payer: COMMERCIAL

## 2025-08-22 VITALS
OXYGEN SATURATION: 98 % | SYSTOLIC BLOOD PRESSURE: 128 MMHG | WEIGHT: 203.2 LBS | DIASTOLIC BLOOD PRESSURE: 82 MMHG | HEART RATE: 76 BPM | BODY MASS INDEX: 30.8 KG/M2 | HEIGHT: 68 IN | TEMPERATURE: 97.9 F

## 2025-08-22 VITALS
WEIGHT: 203 LBS | HEART RATE: 68 BPM | DIASTOLIC BLOOD PRESSURE: 88 MMHG | OXYGEN SATURATION: 98 % | SYSTOLIC BLOOD PRESSURE: 140 MMHG | BODY MASS INDEX: 30.77 KG/M2 | HEIGHT: 68 IN

## 2025-08-22 DIAGNOSIS — Z12.11 SCREEN FOR COLON CANCER: ICD-10-CM

## 2025-08-22 DIAGNOSIS — E66.9 MILD OBESITY: ICD-10-CM

## 2025-08-22 DIAGNOSIS — K21.00 GASTROESOPHAGEAL REFLUX DISEASE WITH ESOPHAGITIS WITHOUT HEMORRHAGE: ICD-10-CM

## 2025-08-22 DIAGNOSIS — N50.89 SCROTAL MASS: ICD-10-CM

## 2025-08-22 DIAGNOSIS — Z12.2 SCREENING FOR LUNG CANCER: ICD-10-CM

## 2025-08-22 DIAGNOSIS — E11.9 INSULIN DEPENDENT TYPE 2 DIABETES MELLITUS: ICD-10-CM

## 2025-08-22 DIAGNOSIS — I10 PRIMARY HYPERTENSION: ICD-10-CM

## 2025-08-22 DIAGNOSIS — I25.2 STATUS POST NON-ST ELEVATION MYOCARDIAL INFARCTION (NSTEMI): ICD-10-CM

## 2025-08-22 DIAGNOSIS — F17.211 CIGARETTE NICOTINE DEPENDENCE IN REMISSION: ICD-10-CM

## 2025-08-22 DIAGNOSIS — Z12.5 SCREENING FOR PROSTATE CANCER: ICD-10-CM

## 2025-08-22 DIAGNOSIS — F17.220 CHEWING TOBACCO NICOTINE DEPENDENCE WITHOUT COMPLICATION: ICD-10-CM

## 2025-08-22 DIAGNOSIS — E78.2 MIXED HYPERLIPIDEMIA: ICD-10-CM

## 2025-08-22 DIAGNOSIS — I25.10 CORONARY ARTERY DISEASE INVOLVING NATIVE CORONARY ARTERY OF NATIVE HEART WITHOUT ANGINA PECTORIS: ICD-10-CM

## 2025-08-22 DIAGNOSIS — I10 PRIMARY HYPERTENSION: Chronic | ICD-10-CM

## 2025-08-22 DIAGNOSIS — I25.10 CORONARY ARTERY DISEASE INVOLVING NATIVE CORONARY ARTERY OF NATIVE HEART WITHOUT ANGINA PECTORIS: Primary | Chronic | ICD-10-CM

## 2025-08-22 DIAGNOSIS — Z79.4 INSULIN DEPENDENT TYPE 2 DIABETES MELLITUS: ICD-10-CM

## 2025-08-22 DIAGNOSIS — Z00.01 ENCOUNTER FOR GENERAL ADULT MEDICAL EXAMINATION WITH ABNORMAL FINDINGS: Primary | ICD-10-CM

## 2025-08-22 DIAGNOSIS — Z95.5 H/O HEART ARTERY STENT: ICD-10-CM

## 2025-08-22 DIAGNOSIS — E55.9 VITAMIN D DEFICIENCY: ICD-10-CM

## 2025-08-22 DIAGNOSIS — E03.9 ACQUIRED HYPOTHYROIDISM: ICD-10-CM

## 2025-08-22 DIAGNOSIS — I65.23 OCCLUSION AND STENOSIS OF BILATERAL CAROTID ARTERIES: Chronic | ICD-10-CM

## 2025-08-22 PROBLEM — Z13.29 SCREENING FOR THYROID DISORDER: Status: RESOLVED | Noted: 2024-07-26 | Resolved: 2025-08-22

## 2025-08-22 LAB
EXPIRATION DATE: ABNORMAL
EXPIRATION DATE: ABNORMAL
EXPIRATION DATE: NORMAL
GLUCOSE BLDC GLUCOMTR-MCNC: 143 MG/DL (ref 70–130)
HBA1C MFR BLD: 8.6 % (ref 4.5–5.7)
Lab: ABNORMAL
Lab: ABNORMAL
Lab: NORMAL
POC ALBUMIN, URINE: 10 MG/L
POC CREATININE, URINE: 50 MG/DL
POC URINE ALB/CREA RATIO: <30

## 2025-08-22 PROCEDURE — 93000 ELECTROCARDIOGRAM COMPLETE: CPT | Performed by: NURSE PRACTITIONER

## 2025-08-22 PROCEDURE — 99214 OFFICE O/P EST MOD 30 MIN: CPT | Performed by: NURSE PRACTITIONER

## 2025-08-23 ENCOUNTER — RESULTS FOLLOW-UP (OUTPATIENT)
Dept: FAMILY MEDICINE CLINIC | Facility: CLINIC | Age: 55
End: 2025-08-23
Payer: COMMERCIAL

## 2025-08-23 LAB
25(OH)D3+25(OH)D2 SERPL-MCNC: 32 NG/ML (ref 30–100)
ALBUMIN SERPL-MCNC: 4.6 G/DL (ref 3.8–4.9)
ALP SERPL-CCNC: 114 IU/L (ref 44–121)
ALT SERPL-CCNC: 20 IU/L (ref 0–44)
APPEARANCE UR: CLEAR
AST SERPL-CCNC: 18 IU/L (ref 0–40)
BACTERIA #/AREA URNS HPF: NORMAL /[HPF]
BILIRUB SERPL-MCNC: 0.5 MG/DL (ref 0–1.2)
BILIRUB UR QL STRIP: NEGATIVE
BUN SERPL-MCNC: 23 MG/DL (ref 6–24)
BUN/CREAT SERPL: 23 (ref 9–20)
CALCIUM SERPL-MCNC: 9.7 MG/DL (ref 8.7–10.2)
CASTS URNS QL MICRO: NORMAL /LPF
CHLORIDE SERPL-SCNC: 105 MMOL/L (ref 96–106)
CHOLEST SERPL-MCNC: 112 MG/DL (ref 100–199)
CO2 SERPL-SCNC: 19 MMOL/L (ref 20–29)
COLOR UR: YELLOW
CREAT SERPL-MCNC: 0.99 MG/DL (ref 0.76–1.27)
EGFRCR SERPLBLD CKD-EPI 2021: 91 ML/MIN/1.73
EPI CELLS #/AREA URNS HPF: NORMAL /HPF (ref 0–10)
GLOBULIN SER CALC-MCNC: 2.6 G/DL (ref 1.5–4.5)
GLUCOSE SERPL-MCNC: 101 MG/DL (ref 70–99)
GLUCOSE UR QL STRIP: NEGATIVE
HDLC SERPL-MCNC: 25 MG/DL
HGB UR QL STRIP: NEGATIVE
KETONES UR QL STRIP: NEGATIVE
LDLC SERPL CALC-MCNC: 71 MG/DL (ref 0–99)
LEUKOCYTE ESTERASE UR QL STRIP: NEGATIVE
MICRO URNS: NORMAL
MICRO URNS: NORMAL
NITRITE UR QL STRIP: NEGATIVE
PH UR STRIP: 6 [PH] (ref 5–7.5)
POTASSIUM SERPL-SCNC: 4.6 MMOL/L (ref 3.5–5.2)
PROT SERPL-MCNC: 7.2 G/DL (ref 6–8.5)
PROT UR QL STRIP: NEGATIVE
PSA SERPL-MCNC: 2 NG/ML (ref 0–4)
RBC #/AREA URNS HPF: NORMAL /HPF (ref 0–2)
SODIUM SERPL-SCNC: 139 MMOL/L (ref 134–144)
SP GR UR STRIP: 1.01 (ref 1–1.03)
T4 FREE SERPL-MCNC: 1.02 NG/DL (ref 0.82–1.77)
TRIGL SERPL-MCNC: 80 MG/DL (ref 0–149)
TSH SERPL DL<=0.005 MIU/L-ACNC: 3.67 UIU/ML (ref 0.45–4.5)
URINALYSIS REFLEX: NORMAL
UROBILINOGEN UR STRIP-MCNC: 0.2 MG/DL (ref 0.2–1)
VLDLC SERPL CALC-MCNC: 16 MG/DL (ref 5–40)
WBC #/AREA URNS HPF: NORMAL /HPF (ref 0–5)

## 2025-08-25 ENCOUNTER — PATIENT ROUNDING (BHMG ONLY) (OUTPATIENT)
Dept: CARDIOLOGY | Facility: CLINIC | Age: 55
End: 2025-08-25
Payer: COMMERCIAL

## 2025-08-25 DIAGNOSIS — E78.2 MIXED HYPERLIPIDEMIA: ICD-10-CM

## 2025-08-25 RX ORDER — ATORVASTATIN CALCIUM 80 MG/1
80 TABLET, FILM COATED ORAL EVERY EVENING
Qty: 90 TABLET | Refills: 3 | Status: SHIPPED | OUTPATIENT
Start: 2025-08-25

## 2025-08-26 DIAGNOSIS — K21.00 GASTROESOPHAGEAL REFLUX DISEASE WITH ESOPHAGITIS WITHOUT HEMORRHAGE: ICD-10-CM

## 2025-08-26 RX ORDER — OMEPRAZOLE 20 MG/1
20 CAPSULE, DELAYED RELEASE ORAL DAILY
Qty: 60 CAPSULE | Refills: 0 | Status: SHIPPED | OUTPATIENT
Start: 2025-08-26

## 2025-08-28 DIAGNOSIS — I25.10 CORONARY ARTERY DISEASE INVOLVING NATIVE CORONARY ARTERY OF NATIVE HEART WITHOUT ANGINA PECTORIS: ICD-10-CM

## 2025-08-28 RX ORDER — CLOPIDOGREL BISULFATE 75 MG/1
75 TABLET ORAL DAILY
Qty: 60 TABLET | Refills: 0 | Status: SHIPPED | OUTPATIENT
Start: 2025-08-28

## (undated) DEVICE — CATH DIAG EXPO M/ PK 6FR FL4/FR4 PIG 3PK

## (undated) DEVICE — Device

## (undated) DEVICE — DEV INFL MONARCH 25W

## (undated) DEVICE — CATH DIAG EXPO .056 FL3.5 6F 100CM

## (undated) DEVICE — SKIN PREP TRAY W/CHG: Brand: MEDLINE INDUSTRIES, INC.

## (undated) DEVICE — PK CATH CARD 10

## (undated) DEVICE — DEV COMP RAD PRELUDESYNC 24CM

## (undated) DEVICE — MODEL AT P65, P/N 701554-001KIT CONTENTS: HAND CONTROLLER, 3-WAY HIGH-PRESSURE STOPCOCK WITH ROTATING END AND PREMIUM HIGH-PRESSURE TUBING: Brand: ANGIOTOUCH® KIT

## (undated) DEVICE — GW PRESS VERRATA STR 185CM 10185P

## (undated) DEVICE — MINI TREK CORONARY DILATATION CATHETER 2.0 MM X 12 MM / RAPID-EXCHANGE: Brand: MINI TREK

## (undated) DEVICE — GLIDESHEATH BASIC HYDROPHILIC COATED INTRODUCER SHEATH: Brand: GLIDESHEATH

## (undated) DEVICE — GUIDE CATHETER: Brand: MACH1™

## (undated) DEVICE — MODEL BT2000 P/N 700287-012KIT CONTENTS: MANIFOLD WITH SALINE AND CONTRAST PORTS, SALINE TUBING WITH SPIKE AND HAND SYRINGE, TRANSDUCER: Brand: BT2000 AUTOMATED MANIFOLD KIT